# Patient Record
Sex: FEMALE | Race: OTHER | HISPANIC OR LATINO | URBAN - METROPOLITAN AREA
[De-identification: names, ages, dates, MRNs, and addresses within clinical notes are randomized per-mention and may not be internally consistent; named-entity substitution may affect disease eponyms.]

---

## 2018-07-09 ENCOUNTER — EMERGENCY (EMERGENCY)
Facility: HOSPITAL | Age: 73
LOS: 1 days | Discharge: HOME | End: 2018-07-09
Attending: EMERGENCY MEDICINE
Payer: SELF-PAY

## 2018-07-09 VITALS
TEMPERATURE: 101 F | RESPIRATION RATE: 20 BRPM | SYSTOLIC BLOOD PRESSURE: 125 MMHG | OXYGEN SATURATION: 97 % | DIASTOLIC BLOOD PRESSURE: 85 MMHG | HEART RATE: 84 BPM

## 2018-07-09 LAB
ALBUMIN SERPL ELPH-MCNC: 3.6 G/DL — SIGNIFICANT CHANGE UP (ref 3.3–5)
ALP SERPL-CCNC: 50 U/L — SIGNIFICANT CHANGE UP (ref 40–120)
ALT FLD-CCNC: 48 U/L — SIGNIFICANT CHANGE UP (ref 12–78)
ANION GAP SERPL CALC-SCNC: 7 MMOL/L — SIGNIFICANT CHANGE UP (ref 5–17)
APPEARANCE UR: CLEAR — SIGNIFICANT CHANGE UP
AST SERPL-CCNC: 64 U/L — HIGH (ref 15–37)
BASOPHILS # BLD AUTO: 0.04 K/UL — SIGNIFICANT CHANGE UP (ref 0–0.2)
BASOPHILS NFR BLD AUTO: 0.5 % — SIGNIFICANT CHANGE UP (ref 0–2)
BILIRUB SERPL-MCNC: 0.3 MG/DL — SIGNIFICANT CHANGE UP (ref 0.2–1.2)
BILIRUB UR-MCNC: NEGATIVE — SIGNIFICANT CHANGE UP
BUN SERPL-MCNC: 10 MG/DL — SIGNIFICANT CHANGE UP (ref 7–23)
CALCIUM SERPL-MCNC: 8.5 MG/DL — SIGNIFICANT CHANGE UP (ref 8.5–10.1)
CHLORIDE SERPL-SCNC: 106 MMOL/L — SIGNIFICANT CHANGE UP (ref 96–108)
CO2 SERPL-SCNC: 28 MMOL/L — SIGNIFICANT CHANGE UP (ref 22–31)
COLOR SPEC: YELLOW — SIGNIFICANT CHANGE UP
CREAT SERPL-MCNC: 0.88 MG/DL — SIGNIFICANT CHANGE UP (ref 0.5–1.3)
DIFF PNL FLD: ABNORMAL
EOSINOPHIL # BLD AUTO: 0.06 K/UL — SIGNIFICANT CHANGE UP (ref 0–0.5)
EOSINOPHIL NFR BLD AUTO: 0.8 % — SIGNIFICANT CHANGE UP (ref 0–6)
FLUAV H3 RNA SPEC QL NAA+PROBE: DETECTED
GLUCOSE SERPL-MCNC: 148 MG/DL — HIGH (ref 70–99)
GLUCOSE UR QL: NEGATIVE — SIGNIFICANT CHANGE UP
HCT VFR BLD CALC: 35.1 % — SIGNIFICANT CHANGE UP (ref 34.5–45)
HGB BLD-MCNC: 12.1 G/DL — SIGNIFICANT CHANGE UP (ref 11.5–15.5)
IMM GRANULOCYTES NFR BLD AUTO: 0.5 % — SIGNIFICANT CHANGE UP (ref 0–1.5)
KETONES UR-MCNC: NEGATIVE — SIGNIFICANT CHANGE UP
LACTATE SERPL-SCNC: 2.5 MMOL/L — HIGH (ref 0.7–2)
LEUKOCYTE ESTERASE UR-ACNC: NEGATIVE — SIGNIFICANT CHANGE UP
LIDOCAIN IGE QN: 149 U/L — SIGNIFICANT CHANGE UP (ref 73–393)
LYMPHOCYTES # BLD AUTO: 1.18 K/UL — SIGNIFICANT CHANGE UP (ref 1–3.3)
LYMPHOCYTES # BLD AUTO: 15.5 % — SIGNIFICANT CHANGE UP (ref 13–44)
MCHC RBC-ENTMCNC: 31.8 PG — SIGNIFICANT CHANGE UP (ref 27–34)
MCHC RBC-ENTMCNC: 34.5 GM/DL — SIGNIFICANT CHANGE UP (ref 32–36)
MCV RBC AUTO: 92.1 FL — SIGNIFICANT CHANGE UP (ref 80–100)
MONOCYTES # BLD AUTO: 0.7 K/UL — SIGNIFICANT CHANGE UP (ref 0–0.9)
MONOCYTES NFR BLD AUTO: 9.2 % — SIGNIFICANT CHANGE UP (ref 2–14)
NEUTROPHILS # BLD AUTO: 5.59 K/UL — SIGNIFICANT CHANGE UP (ref 1.8–7.4)
NEUTROPHILS NFR BLD AUTO: 73.5 % — SIGNIFICANT CHANGE UP (ref 43–77)
NITRITE UR-MCNC: NEGATIVE — SIGNIFICANT CHANGE UP
PH UR: 7 — SIGNIFICANT CHANGE UP (ref 5–8)
PLATELET # BLD AUTO: 196 K/UL — SIGNIFICANT CHANGE UP (ref 150–400)
POTASSIUM SERPL-MCNC: 3.9 MMOL/L — SIGNIFICANT CHANGE UP (ref 3.5–5.3)
POTASSIUM SERPL-SCNC: 3.9 MMOL/L — SIGNIFICANT CHANGE UP (ref 3.5–5.3)
PROCALCITONIN SERPL-MCNC: <0.05 — SIGNIFICANT CHANGE UP (ref 0–0.04)
PROT SERPL-MCNC: 7.8 G/DL — SIGNIFICANT CHANGE UP (ref 6–8.3)
PROT UR-MCNC: NEGATIVE — SIGNIFICANT CHANGE UP
RAPID RVP RESULT: DETECTED
RBC # BLD: 3.81 M/UL — SIGNIFICANT CHANGE UP (ref 3.8–5.2)
RBC # FLD: 12.5 % — SIGNIFICANT CHANGE UP (ref 10.3–14.5)
SODIUM SERPL-SCNC: 141 MMOL/L — SIGNIFICANT CHANGE UP (ref 135–145)
SP GR SPEC: 1.01 — SIGNIFICANT CHANGE UP (ref 1.01–1.02)
UROBILINOGEN FLD QL: NEGATIVE — SIGNIFICANT CHANGE UP
WBC # BLD: 7.61 K/UL — SIGNIFICANT CHANGE UP (ref 3.8–10.5)
WBC # FLD AUTO: 7.61 K/UL — SIGNIFICANT CHANGE UP (ref 3.8–10.5)

## 2018-07-09 PROCEDURE — 76705 ECHO EXAM OF ABDOMEN: CPT | Mod: 26

## 2018-07-09 PROCEDURE — 74176 CT ABD & PELVIS W/O CONTRAST: CPT

## 2018-07-09 PROCEDURE — 76705 ECHO EXAM OF ABDOMEN: CPT

## 2018-07-09 PROCEDURE — 71045 X-RAY EXAM CHEST 1 VIEW: CPT

## 2018-07-09 PROCEDURE — 81001 URINALYSIS AUTO W/SCOPE: CPT

## 2018-07-09 PROCEDURE — 80053 COMPREHEN METABOLIC PANEL: CPT

## 2018-07-09 PROCEDURE — 93005 ELECTROCARDIOGRAM TRACING: CPT

## 2018-07-09 PROCEDURE — 87633 RESP VIRUS 12-25 TARGETS: CPT

## 2018-07-09 PROCEDURE — 87798 DETECT AGENT NOS DNA AMP: CPT

## 2018-07-09 PROCEDURE — 83605 ASSAY OF LACTIC ACID: CPT

## 2018-07-09 PROCEDURE — 87581 M.PNEUMON DNA AMP PROBE: CPT

## 2018-07-09 PROCEDURE — 83690 ASSAY OF LIPASE: CPT

## 2018-07-09 PROCEDURE — 96361 HYDRATE IV INFUSION ADD-ON: CPT

## 2018-07-09 PROCEDURE — 96360 HYDRATION IV INFUSION INIT: CPT

## 2018-07-09 PROCEDURE — 84484 ASSAY OF TROPONIN QUANT: CPT

## 2018-07-09 PROCEDURE — 71045 X-RAY EXAM CHEST 1 VIEW: CPT | Mod: 26

## 2018-07-09 PROCEDURE — 87086 URINE CULTURE/COLONY COUNT: CPT

## 2018-07-09 PROCEDURE — 99284 EMERGENCY DEPT VISIT MOD MDM: CPT

## 2018-07-09 PROCEDURE — 87040 BLOOD CULTURE FOR BACTERIA: CPT

## 2018-07-09 PROCEDURE — 74176 CT ABD & PELVIS W/O CONTRAST: CPT | Mod: 26

## 2018-07-09 PROCEDURE — 93010 ELECTROCARDIOGRAM REPORT: CPT

## 2018-07-09 PROCEDURE — 82550 ASSAY OF CK (CPK): CPT

## 2018-07-09 PROCEDURE — 87486 CHLMYD PNEUM DNA AMP PROBE: CPT

## 2018-07-09 PROCEDURE — 85027 COMPLETE CBC AUTOMATED: CPT

## 2018-07-09 PROCEDURE — 82553 CREATINE MB FRACTION: CPT

## 2018-07-09 PROCEDURE — 99284 EMERGENCY DEPT VISIT MOD MDM: CPT | Mod: 25

## 2018-07-09 PROCEDURE — 84145 PROCALCITONIN (PCT): CPT

## 2018-07-09 PROCEDURE — 36415 COLL VENOUS BLD VENIPUNCTURE: CPT

## 2018-07-09 RX ORDER — SODIUM CHLORIDE 9 MG/ML
1000 INJECTION INTRAMUSCULAR; INTRAVENOUS; SUBCUTANEOUS ONCE
Qty: 0 | Refills: 0 | Status: COMPLETED | OUTPATIENT
Start: 2018-07-09 | End: 2018-07-09

## 2018-07-09 RX ORDER — ACETAMINOPHEN 500 MG
650 TABLET ORAL ONCE
Qty: 0 | Refills: 0 | Status: COMPLETED | OUTPATIENT
Start: 2018-07-09 | End: 2018-07-09

## 2018-07-09 RX ORDER — SODIUM CHLORIDE 9 MG/ML
3 INJECTION INTRAMUSCULAR; INTRAVENOUS; SUBCUTANEOUS ONCE
Qty: 0 | Refills: 0 | Status: COMPLETED | OUTPATIENT
Start: 2018-07-09 | End: 2018-07-09

## 2018-07-09 RX ADMIN — SODIUM CHLORIDE 1000 MILLILITER(S): 9 INJECTION INTRAMUSCULAR; INTRAVENOUS; SUBCUTANEOUS at 23:45

## 2018-07-09 RX ADMIN — SODIUM CHLORIDE 3 MILLILITER(S): 9 INJECTION INTRAMUSCULAR; INTRAVENOUS; SUBCUTANEOUS at 21:32

## 2018-07-09 RX ADMIN — SODIUM CHLORIDE 1000 MILLILITER(S): 9 INJECTION INTRAMUSCULAR; INTRAVENOUS; SUBCUTANEOUS at 22:45

## 2018-07-09 RX ADMIN — SODIUM CHLORIDE 1000 MILLILITER(S): 9 INJECTION INTRAMUSCULAR; INTRAVENOUS; SUBCUTANEOUS at 21:45

## 2018-07-09 RX ADMIN — Medication 650 MILLIGRAM(S): at 21:46

## 2018-07-09 NOTE — ED PROVIDER NOTE - OBJECTIVE STATEMENT
Pt is a 72 yo female who presents to the ED with a cc of fever, cough.  Pt reports that symptoms began last night.  She reports frontal HA, subjective fevers, chills, sore throat, non-productive cough, diffuse chest pain worse when coughing, and SOB.  She further reports epigastric discomfort.  Denies neck pain. N/V/D/C, ext numbness or weakness.  Pt reports that she is visiting from [facility] and has been in the [place] for approx 1 [place].  She currently takes a PPI and a BP medication that is prescribed by her doctor at home.  She has not taken anything for the symptoms

## 2018-07-09 NOTE — ED PROVIDER NOTE - PLAN OF CARE
Return to the ED for any new or worsening symptoms  Take your medication as prescribed  Tamiflu per label instructions   Augmentin 1 tab 2 times a day finish the prescription  Flonase 1 spray to each nostril   Zofran per label instructions as needed for nausea   Tylenol or Motrin per label instructions as needed for fever  Increase your fluid intake   Advance activity as tolerated   Follow up with your PMD in 2-3 days for a recheck

## 2018-07-09 NOTE — ED PROVIDER NOTE - MEDICAL DECISION MAKING DETAILS
screening septic work up, EKG, cardiac enzymes, RVP, chest x-ray, gallbladder sonogram, CT renal stone hunt, IVF, symptom control, observation

## 2018-07-09 NOTE — ED PROVIDER NOTE - PROGRESS NOTE DETAILS
Pt with improvement in symptoms, tolerating po. Results of labs and images reviewed, all questions answered, copy provided.  Will discharge home with outpatient management.

## 2018-07-09 NOTE — ED PROVIDER NOTE - CARE PLAN
Principal Discharge DX:	Influenza A  Assessment and plan of treatment:	Return to the ED for any new or worsening symptoms  Take your medication as prescribed  Tamiflu per label instructions   Augmentin 1 tab 2 times a day finish the prescription  Flonase 1 spray to each nostril   Zofran per label instructions as needed for nausea   Tylenol or Motrin per label instructions as needed for fever  Increase your fluid intake   Advance activity as tolerated   Follow up with your PMD in 2-3 days for a recheck  Secondary Diagnosis:	Otitis media  Secondary Diagnosis:	Cholelithiasis

## 2018-07-10 VITALS
OXYGEN SATURATION: 97 % | HEART RATE: 67 BPM | DIASTOLIC BLOOD PRESSURE: 79 MMHG | SYSTOLIC BLOOD PRESSURE: 125 MMHG | TEMPERATURE: 99 F | RESPIRATION RATE: 16 BRPM

## 2018-07-10 LAB
CULTURE RESULTS: SIGNIFICANT CHANGE UP
LACTATE SERPL-SCNC: 1.8 MMOL/L — SIGNIFICANT CHANGE UP (ref 0.7–2)
SPECIMEN SOURCE: SIGNIFICANT CHANGE UP

## 2018-07-10 RX ORDER — FLUTICASONE PROPIONATE 50 MCG
1 SPRAY, SUSPENSION NASAL
Qty: 1 | Refills: 0
Start: 2018-07-10 | End: 2018-08-08

## 2018-07-10 RX ORDER — ONDANSETRON 8 MG/1
1 TABLET, FILM COATED ORAL
Qty: 15 | Refills: 0
Start: 2018-07-10 | End: 2018-07-14

## 2018-07-14 LAB
CULTURE RESULTS: SIGNIFICANT CHANGE UP
CULTURE RESULTS: SIGNIFICANT CHANGE UP
SPECIMEN SOURCE: SIGNIFICANT CHANGE UP
SPECIMEN SOURCE: SIGNIFICANT CHANGE UP

## 2019-10-01 ENCOUNTER — TRANSCRIPTION ENCOUNTER (OUTPATIENT)
Age: 74
End: 2019-10-01

## 2019-10-01 ENCOUNTER — INPATIENT (INPATIENT)
Facility: HOSPITAL | Age: 74
LOS: 4 days | Discharge: HOME | DRG: 854 | End: 2019-10-06
Attending: STUDENT IN AN ORGANIZED HEALTH CARE EDUCATION/TRAINING PROGRAM | Admitting: STUDENT IN AN ORGANIZED HEALTH CARE EDUCATION/TRAINING PROGRAM
Payer: MEDICAID

## 2019-10-01 VITALS
SYSTOLIC BLOOD PRESSURE: 136 MMHG | HEART RATE: 86 BPM | RESPIRATION RATE: 18 BRPM | OXYGEN SATURATION: 94 % | DIASTOLIC BLOOD PRESSURE: 86 MMHG | TEMPERATURE: 99 F | WEIGHT: 164.91 LBS

## 2019-10-01 LAB
ALBUMIN SERPL ELPH-MCNC: 3.7 G/DL — SIGNIFICANT CHANGE UP (ref 3.3–5)
ALP SERPL-CCNC: 63 U/L — SIGNIFICANT CHANGE UP (ref 40–120)
ALT FLD-CCNC: 56 U/L — SIGNIFICANT CHANGE UP (ref 12–78)
ANION GAP SERPL CALC-SCNC: 7 MMOL/L — SIGNIFICANT CHANGE UP (ref 5–17)
APTT BLD: 26.7 SEC — LOW (ref 28.5–37)
AST SERPL-CCNC: 64 U/L — HIGH (ref 15–37)
BASOPHILS # BLD AUTO: 0.05 K/UL — SIGNIFICANT CHANGE UP (ref 0–0.2)
BASOPHILS NFR BLD AUTO: 0.3 % — SIGNIFICANT CHANGE UP (ref 0–2)
BILIRUB SERPL-MCNC: 0.6 MG/DL — SIGNIFICANT CHANGE UP (ref 0.2–1.2)
BUN SERPL-MCNC: 13 MG/DL — SIGNIFICANT CHANGE UP (ref 7–23)
CALCIUM SERPL-MCNC: 8.8 MG/DL — SIGNIFICANT CHANGE UP (ref 8.5–10.1)
CHLORIDE SERPL-SCNC: 107 MMOL/L — SIGNIFICANT CHANGE UP (ref 96–108)
CO2 SERPL-SCNC: 26 MMOL/L — SIGNIFICANT CHANGE UP (ref 22–31)
CREAT SERPL-MCNC: 1 MG/DL — SIGNIFICANT CHANGE UP (ref 0.5–1.3)
EOSINOPHIL # BLD AUTO: 0.03 K/UL — SIGNIFICANT CHANGE UP (ref 0–0.5)
EOSINOPHIL NFR BLD AUTO: 0.2 % — SIGNIFICANT CHANGE UP (ref 0–6)
GLUCOSE SERPL-MCNC: 170 MG/DL — HIGH (ref 70–99)
HCT VFR BLD CALC: 38 % — SIGNIFICANT CHANGE UP (ref 34.5–45)
HGB BLD-MCNC: 12.6 G/DL — SIGNIFICANT CHANGE UP (ref 11.5–15.5)
IMM GRANULOCYTES NFR BLD AUTO: 0.3 % — SIGNIFICANT CHANGE UP (ref 0–1.5)
INR BLD: 1.09 RATIO — SIGNIFICANT CHANGE UP (ref 0.88–1.16)
LYMPHOCYTES # BLD AUTO: 1.1 K/UL — SIGNIFICANT CHANGE UP (ref 1–3.3)
LYMPHOCYTES # BLD AUTO: 7.1 % — LOW (ref 13–44)
MCHC RBC-ENTMCNC: 31.2 PG — SIGNIFICANT CHANGE UP (ref 27–34)
MCHC RBC-ENTMCNC: 33.2 GM/DL — SIGNIFICANT CHANGE UP (ref 32–36)
MCV RBC AUTO: 94.1 FL — SIGNIFICANT CHANGE UP (ref 80–100)
MONOCYTES # BLD AUTO: 0.88 K/UL — SIGNIFICANT CHANGE UP (ref 0–0.9)
MONOCYTES NFR BLD AUTO: 5.7 % — SIGNIFICANT CHANGE UP (ref 2–14)
NEUTROPHILS # BLD AUTO: 13.35 K/UL — HIGH (ref 1.8–7.4)
NEUTROPHILS NFR BLD AUTO: 86.4 % — HIGH (ref 43–77)
NRBC # BLD: 0 /100 WBCS — SIGNIFICANT CHANGE UP (ref 0–0)
PLATELET # BLD AUTO: 144 K/UL — LOW (ref 150–400)
POTASSIUM SERPL-MCNC: 3.8 MMOL/L — SIGNIFICANT CHANGE UP (ref 3.5–5.3)
POTASSIUM SERPL-SCNC: 3.8 MMOL/L — SIGNIFICANT CHANGE UP (ref 3.5–5.3)
PROT SERPL-MCNC: 8.1 G/DL — SIGNIFICANT CHANGE UP (ref 6–8.3)
PROTHROM AB SERPL-ACNC: 12.3 SEC — SIGNIFICANT CHANGE UP (ref 10–12.9)
RBC # BLD: 4.04 M/UL — SIGNIFICANT CHANGE UP (ref 3.8–5.2)
RBC # FLD: 13 % — SIGNIFICANT CHANGE UP (ref 10.3–14.5)
SODIUM SERPL-SCNC: 140 MMOL/L — SIGNIFICANT CHANGE UP (ref 135–145)
WBC # BLD: 15.46 K/UL — HIGH (ref 3.8–10.5)
WBC # FLD AUTO: 15.46 K/UL — HIGH (ref 3.8–10.5)

## 2019-10-01 RX ORDER — PIPERACILLIN AND TAZOBACTAM 4; .5 G/20ML; G/20ML
3.38 INJECTION, POWDER, LYOPHILIZED, FOR SOLUTION INTRAVENOUS ONCE
Refills: 0 | Status: COMPLETED | OUTPATIENT
Start: 2019-10-01 | End: 2019-10-01

## 2019-10-01 RX ORDER — ACETAMINOPHEN 500 MG
650 TABLET ORAL ONCE
Refills: 0 | Status: COMPLETED | OUTPATIENT
Start: 2019-10-01 | End: 2019-10-01

## 2019-10-01 RX ORDER — SODIUM CHLORIDE 9 MG/ML
2000 INJECTION INTRAMUSCULAR; INTRAVENOUS; SUBCUTANEOUS ONCE
Refills: 0 | Status: COMPLETED | OUTPATIENT
Start: 2019-10-01 | End: 2019-10-01

## 2019-10-01 RX ADMIN — Medication 650 MILLIGRAM(S): at 23:40

## 2019-10-01 RX ADMIN — PIPERACILLIN AND TAZOBACTAM 200 GRAM(S): 4; .5 INJECTION, POWDER, LYOPHILIZED, FOR SOLUTION INTRAVENOUS at 23:41

## 2019-10-01 RX ADMIN — SODIUM CHLORIDE 2000 MILLILITER(S): 9 INJECTION INTRAMUSCULAR; INTRAVENOUS; SUBCUTANEOUS at 23:41

## 2019-10-01 NOTE — ED PROVIDER NOTE - CARE PLAN
Principal Discharge DX:	Obstructive uropathy Principal Discharge DX:	Obstructive uropathy  Secondary Diagnosis:	Sepsis

## 2019-10-01 NOTE — ED PROVIDER NOTE - OBJECTIVE STATEMENT
HO from pt's friend Corinne.  pt llq abd pain today c fever 100.  pt is here on vac from [facility]. pmd none.  prior ho due "colon problem".

## 2019-10-01 NOTE — ED ADULT NURSE NOTE - OBJECTIVE STATEMENT
Pt received alert and oriented x 4 c/o fever, lower back pain and lower abd pain. Pt reports 8/10 pain to lower back and abdomen with chills and body aches. Pt reports nausea, but denies fever, chills, vomiting, diarrhea, sob, chest pain. Pt is from [facility] visiting family. Pt has been here since 8/13/19,

## 2019-10-01 NOTE — ED ADULT NURSE NOTE - NSIMPLEMENTINTERV_GEN_ALL_ED
Implemented All Fall Risk Interventions:  [place] to call system. Call bell, personal items and telephone within reach. Instruct patient to call for assistance. Room bathroom lighting operational. Non-slip footwear when patient is off stretcher. Physically safe environment: no spills, clutter or unnecessary equipment. Stretcher in lowest position, wheels locked, appropriate side rails in place. Provide visual cue, wrist band, yellow gown, etc. Monitor gait and stability. Monitor for mental status changes and reorient to person, place, and time. Review medications for side effects contributing to fall risk. Reinforce activity limits and safety measures with patient and family.

## 2019-10-02 DIAGNOSIS — I10 ESSENTIAL (PRIMARY) HYPERTENSION: ICD-10-CM

## 2019-10-02 DIAGNOSIS — Z29.9 ENCOUNTER FOR PROPHYLACTIC MEASURES, UNSPECIFIED: ICD-10-CM

## 2019-10-02 DIAGNOSIS — N13.9 OBSTRUCTIVE AND REFLUX UROPATHY, UNSPECIFIED: ICD-10-CM

## 2019-10-02 DIAGNOSIS — A41.9 SEPSIS, UNSPECIFIED ORGANISM: ICD-10-CM

## 2019-10-02 DIAGNOSIS — K21.9 GASTRO-ESOPHAGEAL REFLUX DISEASE WITHOUT ESOPHAGITIS: ICD-10-CM

## 2019-10-02 DIAGNOSIS — N20.1 CALCULUS OF URETER: ICD-10-CM

## 2019-10-02 LAB
ANION GAP SERPL CALC-SCNC: 9 MMOL/L — SIGNIFICANT CHANGE UP (ref 5–17)
APPEARANCE UR: CLEAR — SIGNIFICANT CHANGE UP
BILIRUB UR-MCNC: NEGATIVE — SIGNIFICANT CHANGE UP
BUN SERPL-MCNC: 11 MG/DL — SIGNIFICANT CHANGE UP (ref 7–23)
CALCIUM SERPL-MCNC: 7.6 MG/DL — LOW (ref 8.5–10.1)
CHLORIDE SERPL-SCNC: 114 MMOL/L — HIGH (ref 96–108)
CO2 SERPL-SCNC: 22 MMOL/L — SIGNIFICANT CHANGE UP (ref 22–31)
COLOR SPEC: SIGNIFICANT CHANGE UP
CREAT SERPL-MCNC: 0.95 MG/DL — SIGNIFICANT CHANGE UP (ref 0.5–1.3)
DIFF PNL FLD: ABNORMAL
E CLOAC COMP DNA BLD POS QL NAA+PROBE: SIGNIFICANT CHANGE UP
EPI CELLS # UR: SIGNIFICANT CHANGE UP
GLUCOSE SERPL-MCNC: 149 MG/DL — HIGH (ref 70–99)
GLUCOSE UR QL: NEGATIVE — SIGNIFICANT CHANGE UP
GRAM STN FLD: SIGNIFICANT CHANGE UP
GRAM STN FLD: SIGNIFICANT CHANGE UP
HCT VFR BLD CALC: 34.3 % — LOW (ref 34.5–45)
HGB BLD-MCNC: 11.3 G/DL — LOW (ref 11.5–15.5)
KETONES UR-MCNC: NEGATIVE — SIGNIFICANT CHANGE UP
LACTATE SERPL-SCNC: 3.2 MMOL/L — HIGH (ref 0.7–2)
LACTATE SERPL-SCNC: 4.4 MMOL/L — CRITICAL HIGH (ref 0.7–2)
LACTATE SERPL-SCNC: 4.4 MMOL/L — CRITICAL HIGH (ref 0.7–2)
LACTATE SERPL-SCNC: 5.9 MMOL/L — CRITICAL HIGH (ref 0.7–2)
LEUKOCYTE ESTERASE UR-ACNC: ABNORMAL
MCHC RBC-ENTMCNC: 31.4 PG — SIGNIFICANT CHANGE UP (ref 27–34)
MCHC RBC-ENTMCNC: 32.9 GM/DL — SIGNIFICANT CHANGE UP (ref 32–36)
MCV RBC AUTO: 95.3 FL — SIGNIFICANT CHANGE UP (ref 80–100)
METHOD TYPE: SIGNIFICANT CHANGE UP
NITRITE UR-MCNC: NEGATIVE — SIGNIFICANT CHANGE UP
NRBC # BLD: 0 /100 WBCS — SIGNIFICANT CHANGE UP (ref 0–0)
PH UR: 6.5 — SIGNIFICANT CHANGE UP (ref 5–8)
PLATELET # BLD AUTO: 121 K/UL — LOW (ref 150–400)
POTASSIUM SERPL-MCNC: 3.4 MMOL/L — LOW (ref 3.5–5.3)
POTASSIUM SERPL-SCNC: 3.4 MMOL/L — LOW (ref 3.5–5.3)
PROT UR-MCNC: NEGATIVE — SIGNIFICANT CHANGE UP
RBC # BLD: 3.6 M/UL — LOW (ref 3.8–5.2)
RBC # FLD: 13.2 % — SIGNIFICANT CHANGE UP (ref 10.3–14.5)
RBC CASTS # UR COMP ASSIST: SIGNIFICANT CHANGE UP /HPF (ref 0–4)
SODIUM SERPL-SCNC: 145 MMOL/L — SIGNIFICANT CHANGE UP (ref 135–145)
SP GR SPEC: 1 — LOW (ref 1.01–1.02)
SPECIMEN SOURCE: SIGNIFICANT CHANGE UP
SPECIMEN SOURCE: SIGNIFICANT CHANGE UP
UROBILINOGEN FLD QL: NEGATIVE — SIGNIFICANT CHANGE UP
WBC # BLD: 9.69 K/UL — SIGNIFICANT CHANGE UP (ref 3.8–10.5)
WBC # FLD AUTO: 9.69 K/UL — SIGNIFICANT CHANGE UP (ref 3.8–10.5)
WBC UR QL: SIGNIFICANT CHANGE UP

## 2019-10-02 PROCEDURE — 71045 X-RAY EXAM CHEST 1 VIEW: CPT | Mod: 26

## 2019-10-02 PROCEDURE — 12345: CPT | Mod: NC

## 2019-10-02 PROCEDURE — 74177 CT ABD & PELVIS W/CONTRAST: CPT | Mod: 26

## 2019-10-02 PROCEDURE — 99285 EMERGENCY DEPT VISIT HI MDM: CPT

## 2019-10-02 PROCEDURE — 93010 ELECTROCARDIOGRAM REPORT: CPT

## 2019-10-02 RX ORDER — TRAMADOL HYDROCHLORIDE 50 MG/1
25 TABLET ORAL THREE TIMES A DAY
Refills: 0 | Status: DISCONTINUED | OUTPATIENT
Start: 2019-10-02 | End: 2019-10-06

## 2019-10-02 RX ORDER — IBUPROFEN 200 MG
600 TABLET ORAL ONCE
Refills: 0 | Status: COMPLETED | OUTPATIENT
Start: 2019-10-02 | End: 2019-10-02

## 2019-10-02 RX ORDER — ACETAMINOPHEN 500 MG
1000 TABLET ORAL ONCE
Refills: 0 | Status: DISCONTINUED | OUTPATIENT
Start: 2019-10-02 | End: 2019-10-02

## 2019-10-02 RX ORDER — ACETAMINOPHEN 500 MG
1000 TABLET ORAL EVERY 6 HOURS
Refills: 0 | Status: DISCONTINUED | OUTPATIENT
Start: 2019-10-02 | End: 2019-10-06

## 2019-10-02 RX ORDER — CEFTRIAXONE 500 MG/1
1000 INJECTION, POWDER, FOR SOLUTION INTRAMUSCULAR; INTRAVENOUS EVERY 24 HOURS
Refills: 0 | Status: DISCONTINUED | OUTPATIENT
Start: 2019-10-02 | End: 2019-10-02

## 2019-10-02 RX ORDER — ONDANSETRON 8 MG/1
4 TABLET, FILM COATED ORAL EVERY 6 HOURS
Refills: 0 | Status: DISCONTINUED | OUTPATIENT
Start: 2019-10-02 | End: 2019-10-02

## 2019-10-02 RX ORDER — ESOMEPRAZOLE MAGNESIUM 40 MG/1
1 CAPSULE, DELAYED RELEASE ORAL
Qty: 0 | Refills: 0 | DISCHARGE

## 2019-10-02 RX ORDER — OLMESARTAN MEDOXOMIL 5 MG/1
1 TABLET, FILM COATED ORAL
Qty: 0 | Refills: 0 | DISCHARGE

## 2019-10-02 RX ORDER — SODIUM CHLORIDE 9 MG/ML
500 INJECTION INTRAMUSCULAR; INTRAVENOUS; SUBCUTANEOUS ONCE
Refills: 0 | Status: COMPLETED | OUTPATIENT
Start: 2019-10-02 | End: 2019-10-02

## 2019-10-02 RX ORDER — PHENAZOPYRIDINE HCL 100 MG
200 TABLET ORAL THREE TIMES A DAY
Refills: 0 | Status: DISCONTINUED | OUTPATIENT
Start: 2019-10-02 | End: 2019-10-06

## 2019-10-02 RX ORDER — PANTOPRAZOLE SODIUM 20 MG/1
40 TABLET, DELAYED RELEASE ORAL
Refills: 0 | Status: DISCONTINUED | OUTPATIENT
Start: 2019-10-02 | End: 2019-10-06

## 2019-10-02 RX ORDER — POTASSIUM CHLORIDE 20 MEQ
40 PACKET (EA) ORAL ONCE
Refills: 0 | Status: COMPLETED | OUTPATIENT
Start: 2019-10-02 | End: 2019-10-02

## 2019-10-02 RX ORDER — CEFTRIAXONE 500 MG/1
1000 INJECTION, POWDER, FOR SOLUTION INTRAMUSCULAR; INTRAVENOUS EVERY 24 HOURS
Refills: 0 | Status: DISCONTINUED | OUTPATIENT
Start: 2019-10-02 | End: 2019-10-03

## 2019-10-02 RX ORDER — ACETAMINOPHEN 500 MG
650 TABLET ORAL EVERY 6 HOURS
Refills: 0 | Status: DISCONTINUED | OUTPATIENT
Start: 2019-10-02 | End: 2019-10-06

## 2019-10-02 RX ORDER — HYDROMORPHONE HYDROCHLORIDE 2 MG/ML
0.5 INJECTION INTRAMUSCULAR; INTRAVENOUS; SUBCUTANEOUS
Refills: 0 | Status: DISCONTINUED | OUTPATIENT
Start: 2019-10-02 | End: 2019-10-02

## 2019-10-02 RX ORDER — SODIUM CHLORIDE 9 MG/ML
1000 INJECTION INTRAMUSCULAR; INTRAVENOUS; SUBCUTANEOUS ONCE
Refills: 0 | Status: COMPLETED | OUTPATIENT
Start: 2019-10-02 | End: 2019-10-02

## 2019-10-02 RX ORDER — SODIUM CHLORIDE 9 MG/ML
1000 INJECTION INTRAMUSCULAR; INTRAVENOUS; SUBCUTANEOUS
Refills: 0 | Status: DISCONTINUED | OUTPATIENT
Start: 2019-10-02 | End: 2019-10-02

## 2019-10-02 RX ORDER — SODIUM CHLORIDE 9 MG/ML
1000 INJECTION, SOLUTION INTRAVENOUS
Refills: 0 | Status: DISCONTINUED | OUTPATIENT
Start: 2019-10-02 | End: 2019-10-03

## 2019-10-02 RX ADMIN — Medication 600 MILLIGRAM(S): at 01:59

## 2019-10-02 RX ADMIN — SODIUM CHLORIDE 500 MILLILITER(S): 9 INJECTION INTRAMUSCULAR; INTRAVENOUS; SUBCUTANEOUS at 01:20

## 2019-10-02 RX ADMIN — SODIUM CHLORIDE 1000 MILLILITER(S): 9 INJECTION INTRAMUSCULAR; INTRAVENOUS; SUBCUTANEOUS at 03:35

## 2019-10-02 RX ADMIN — SODIUM CHLORIDE 500 MILLILITER(S): 9 INJECTION INTRAMUSCULAR; INTRAVENOUS; SUBCUTANEOUS at 00:20

## 2019-10-02 RX ADMIN — SODIUM CHLORIDE 2000 MILLILITER(S): 9 INJECTION INTRAMUSCULAR; INTRAVENOUS; SUBCUTANEOUS at 00:41

## 2019-10-02 RX ADMIN — CEFTRIAXONE 100 MILLIGRAM(S): 500 INJECTION, POWDER, FOR SOLUTION INTRAMUSCULAR; INTRAVENOUS at 06:33

## 2019-10-02 RX ADMIN — SODIUM CHLORIDE 125 MILLILITER(S): 9 INJECTION, SOLUTION INTRAVENOUS at 22:22

## 2019-10-02 RX ADMIN — Medication 40 MILLIEQUIVALENT(S): at 22:20

## 2019-10-02 RX ADMIN — Medication 650 MILLIGRAM(S): at 00:40

## 2019-10-02 RX ADMIN — Medication 650 MILLIGRAM(S): at 14:07

## 2019-10-02 RX ADMIN — Medication 200 MILLIGRAM(S): at 22:18

## 2019-10-02 RX ADMIN — CEFTRIAXONE 100 MILLIGRAM(S): 500 INJECTION, POWDER, FOR SOLUTION INTRAMUSCULAR; INTRAVENOUS at 06:30

## 2019-10-02 RX ADMIN — Medication 200 MILLIGRAM(S): at 14:08

## 2019-10-02 RX ADMIN — SODIUM CHLORIDE 100 MILLILITER(S): 9 INJECTION INTRAMUSCULAR; INTRAVENOUS; SUBCUTANEOUS at 07:09

## 2019-10-02 RX ADMIN — PIPERACILLIN AND TAZOBACTAM 3.38 GRAM(S): 4; .5 INJECTION, POWDER, LYOPHILIZED, FOR SOLUTION INTRAVENOUS at 00:41

## 2019-10-02 RX ADMIN — SODIUM CHLORIDE 100 MILLILITER(S): 9 INJECTION INTRAMUSCULAR; INTRAVENOUS; SUBCUTANEOUS at 07:16

## 2019-10-02 RX ADMIN — SODIUM CHLORIDE 125 MILLILITER(S): 9 INJECTION, SOLUTION INTRAVENOUS at 14:13

## 2019-10-02 RX ADMIN — Medication 650 MILLIGRAM(S): at 15:00

## 2019-10-02 NOTE — H&P ADULT - PROBLEM SELECTOR PLAN 4
DVT ppx: SCDs for now; heparin subQ post-operatively    IMPROVE VTE Individual Risk Assessment  RISK                                                                Points  [  ] Previous VTE                                                  3  [  ] Thrombophilia                                               2  [  ] Lower limb paralysis                                      2        (unable to hold up >15 seconds)    [  ] Current Cancer                                              2         (within 6 months)  [  ] Immobilization > 24 hrs                                1  [  ] ICU/CCU stay > 24 hours                              1  [ x ] Age > 60                                                      1    IMPROVE VTE Score: 1    IMPROVE Score 0-1: Low Risk, No VTE prophylaxis required for most patients, encourage ambulation. states that she has a colon bacterial problem  on home esomeprazole  continue PPI post operatively states that she has a colon bacterial problem  on home esomeprazole  continue PPI post-op

## 2019-10-02 NOTE — CONSULT NOTE ADULT - SUBJECTIVE AND OBJECTIVE BOX
CHIEF COMPLAINT: Left abdominal pain    HISTORY OF PRESENT ILLNESS:    74 year old female with a several day h/o left abdominal pain and fever. Patient reports having a "colon problem" that her pain was initially attributed to. Pain intensified and she developed fever and chills. temp was 102.1 upon presentation to ED. It has since declined. Her lactate level has increased to 4.4. Wbc 15.46 with left shift. CT demonstrated a 1 cm left UPJ calculus with hydronephrosis.    PAST MEDICAL & SURGICAL HISTORY:  No pertinent past medical history: reported hx of &quot;colon problem&quot;  No significant past surgical history      REVIEW OF SYSTEMS:    CONSTITUTIONAL: fever and chills as above  EYES/ENT: No visual changes;  No vertigo or throat pain   NECK: No pain or stiffness  RESPIRATORY: No cough, wheezing, hemoptysis; No shortness of breath  CARDIOVASCULAR: No chest pain or palpitations  GASTROINTESTINAL: Left sided abdominal pain. No diarrhea or constipation. No melena or hematochezia.  GENITOURINARY: No dysuria, frequency or hematuria  NEUROLOGICAL: No numbness or weakness  SKIN: No itching, burning, rashes, or lesions   All other review of systems is negative unless indicated above.    MEDICATIONS  (STANDING):    MEDICATIONS  (PRN):      Allergies None    No Known Allergies    Intolerances        SOCIAL HISTORY:    FAMILY HISTORY:  No pertinent family history in first degree relatives      Vital Signs Last 24 Hrs  T(C): 37.7 (02 Oct 2019 05:24), Max: 39.2 (02 Oct 2019 01:50)  T(F): 99.9 (02 Oct 2019 05:24), Max: 102.5 (02 Oct 2019 01:50)  HR: 83 (02 Oct 2019 05:24) (83 - 98)  BP: 100/66 (02 Oct 2019 05:24) (100/66 - 145/79)  BP(mean): --  RR: 16 (02 Oct 2019 05:24) (16 - 18)  SpO2: 96% (02 Oct 2019 05:24) (94% - 96%)    PHYSICAL EXAM:    Constitutional: NAD, well-developed  Back: Moderate left CVA tenderness  Abd: BS+, Moderate left CVAT and LLQ tenderness  Extremities: No peripheral edema  Psychiatric: Normal mood, normal affect  Skin: No rashes    LABS:                        12.6   15.46 )-----------( 144      ( 01 Oct 2019 23:18 )             38.0     10    140  |  107  |  13  ----------------------------<  170<H>  3.8   |  26  |  1.00    Ca    8.8      01 Oct 2019 23:18    TPro  8.1  /  Alb  3.7  /  TBili  0.6  /  DBili  x   /  AST  64<H>  /  ALT  56  /  AlkPhos  63  10-01    PT/INR - ( 01 Oct 2019 23:18 )   PT: 12.3 sec;   INR: 1.09 ratio         PTT - ( 01 Oct 2019 23:18 )  PTT:26.7 sec  Urinalysis Basic - ( 02 Oct 2019 04:37 )    Color: Pale Yellow / Appearance: Clear / S.005 / pH: x  Gluc: x / Ketone: Negative  / Bili: Negative / Urobili: Negative   Blood: x / Protein: Negative / Nitrite: Negative   Leuk Esterase: Trace / RBC: 0-2 /HPF / WBC 0-2   Sq Epi: x / Non Sq Epi: Few / Bacteria: x      RADIOLOGY & ADDITIONAL STUDIES:    < from: CT Abdomen and Pelvis w/ IV Cont (10.02.19 @ 03:53) >    EXAM:  CT ABDOMEN AND PELVIS IC                            PROCEDURE DATE:  10/02/2019          INTERPRETATION:  CLINICAL INFORMATION: Left abdominal pain.    COMPARISON: None.    TECHNIQUE: Contrast enhanced CT of the abdomen and pelvis was performed   with coronal and sagittal reformats. 95 cc Omnipaque 350 were   administered intravenously and 5 cc were discarded.     FINDINGS:    LOWER CHEST: Within normal limits.    HEPATOBILIARY: Within normal limits.  PANCREAS: Within normal limits.  SPLEEN: Within normal limits.  ADRENALS: Within normal limits.    KIDNEYS/URETERS/BLADDER: 1 cm left ureteropelvic junction calculus   causing moderate hydronephrosis, delayed nephrogram and mild perinephric   fat stranding with trace pararenal and left paracolic gutter ascites.   Right kidney, ureter and bladder are within normal limits.  REPRODUCTIVE ORGANS: Within normal limits.    BOWEL/PERITONEUM: No bowel obstruction or pneumoperitoneum.  Appendix not   clearly visualized, however no pericecal inflammation. Portions of the   gastrointestinal tract are collapsed, limiting evaluation.     VESSELS:  Within normal limits.  LYMPHATICS/RETROPERITONEUM: No lymphadenopathy. No retroperitoneal   hematoma.      SOFT TISSUES: Within normal limits.  BONES: Multilevel spinal degenerative changes.    IMPRESSION: Left obstructive uropathy secondary to 1 cm left   ureteropelvic junction calculus. Urology follow-up recommended.                          JASSON CLOUD M.D., ATTENDING RADIOLOGIST  This document has been electronically signed. Oct  2 2019  3:54AM                < end of copied text >

## 2019-10-02 NOTE — H&P ADULT - PROBLEM SELECTOR PLAN 1
L obstructive uropathy 2/2 L ureteropelvic junction calculus on CT A/P  - Admit to [facility]  - s/p NS boluses (total of 3.5 L), IV Zosyn x1 in the ED L obstructive uropathy 2/2 L ureteropelvic junction calculus on CT A/P  - Admit to F  - s/p NS boluses (total of 3.5 L), IV Zosyn x1 in the ED  - for OR with Dr. Tay this AM  - EKG NSR at 87bpm LAD ?anterior infarct no acute ischemic changes  - no history of cardiac problems, denies having echocardiogram, no cardiac sx  - No absolute medical contraindication to proposed procedure  - would continue with rocephin post operatively  - pain control PRN secondary to obstructing stone  f/u blood/urine cultures  s/p IV zosyn in ED  would continue with IV rocephin post-op  for emergent OR with Dr. Tay secondary to obstructing stone  f/u blood/urine cultures  s/p IV zosyn in ED and >30cc/kg NS boluses  continue maintenance fluids post operative  lactate trended up, check repeat lactate post operatively  would continue with IV rocephin post-op  for emergent OR with Dr. Tay secondary to obstructing stone  f/u blood/urine cultures  s/p IV zosyn in ED and >30cc/kg NS boluses  continue maintenance fluids post operative  lactate trended up, check repeat lactate post operatively  continue with IV rocephin  for emergent OR with Dr. Tay

## 2019-10-02 NOTE — CHART NOTE - NSCHARTNOTEFT_GEN_A_CORE
Called for positive blood cx with GNR. Patient admitted with sepsis secondary to left obstructing ureteral stone. S/p stent placement with urology today. Patient remains with borderline blood pressures. On rocephin and IV fluids. Had received >30cc kg fluid resuscitation and remains on aggressive IVF resuscitation.    Vital Signs Last 24 Hrs  T(C): 37.6 (02 Oct 2019 16:31), Max: 39.2 (02 Oct 2019 01:50)  T(F): 99.6 (02 Oct 2019 16:31), Max: 102.5 (02 Oct 2019 01:50)  HR: 75 (02 Oct 2019 16:31) (72 - 98)  BP: 94/56 (02 Oct 2019 16:31) (87/53 - 145/79)  BP(mean): 75 (02 Oct 2019 07:26) (65 - 75)  RR: 17 (02 Oct 2019 16:31) (16 - 20)  SpO2: 95% (02 Oct 2019 16:31) (94% - 100%)    impression: GNR septicemia urinary source likely 2/2 obstruction stone POD#0 stent placement  - continue IVF and rocephin  - check repeat lactate in AM  - repeat blood cultures ordered  - ID consulted Dr. Solares

## 2019-10-02 NOTE — H&P ADULT - NSHPREVIEWOFSYSTEMS_GEN_ALL_CORE
CONSTITUTIONAL: admits fever, chills, fatigue, weakness  HEENT: denies blurred vision, sore throat  SKIN: denies new lesions, rash  CARDIOVASCULAR: denies chest pain, chest pressure, palpitations  RESPIRATORY: denies shortness of breath, cough, sputum production  GASTROINTESTINAL: denies nausea, vomiting, diarrhea, abdominal pain  GENITOURINARY: denies dysuria, discharge  NEUROLOGICAL: denies numbness, headache, focal weakness  MUSCULOSKELETAL: denies new joint pain, muscle aches  HEMATOLOGIC: denies gross bleeding, bruising  LYMPHATICS: denies extremity swelling  PSYCHIATRIC: denies recent changes in anxiety, depression CONSTITUTIONAL: admits fever, chills, fatigue, weakness  HEENT: denies blurred vision, sore throat  SKIN: denies new lesions, rash  CARDIOVASCULAR: denies chest pain, chest pressure, palpitations  RESPIRATORY: denies shortness of breath, cough, sputum production  GASTROINTESTINAL: denies vomiting, diarrhea; admits left sided abdominal pain (more toward flank), admits constipation   GENITOURINARY: denies dysuria, discharge; admits left flank pain traveling to back  NEUROLOGICAL: denies numbness, headache, focal weakness  MUSCULOSKELETAL: denies new joint pain, muscle aches  HEMATOLOGIC: denies gross bleeding, bruising  LYMPHATICS: denies extremity swelling  PSYCHIATRIC: denies recent changes in anxiety, depression

## 2019-10-02 NOTE — BRIEF OPERATIVE NOTE - NSICDXBRIEFPROCEDURE_GEN_ALL_CORE_FT
PROCEDURES:  Insertion, ureteral stent 02-Oct-2019 06:52:42 left with retrograde pyelogram Mannie Tay

## 2019-10-02 NOTE — H&P ADULT - PROBLEM SELECTOR PLAN 3
states that she has a colon bacterial problem  on home esomeprazole  continue PPI post operatively on olmesartan 40mg daily at home  hold ARB at this time pre-operatively  resume with losartan therapeutic exchange post operatively  monitor routine hemodynamics BP soft on presentation  on olmesartan 40mg daily at home  hold ARB at this time pre-operatively  resume with losartan therapeutic exchange post operatively when BP improves  monitor routine hemodynamics

## 2019-10-02 NOTE — H&P ADULT - NSICDXFAMILYHX_GEN_ALL_CORE_FT
FAMILY HISTORY:  No pertinent family history in first degree relatives FAMILY HISTORY:  FH: type 2 diabetes, daughter

## 2019-10-02 NOTE — H&P ADULT - PROBLEM SELECTOR PLAN 2
IMPROVE VTE Individual Risk Assessment  RISK                                                                Points  [  ] Previous VTE                                                  3  [  ] Thrombophilia                                               2  [  ] Lower limb paralysis                                      2        (unable to hold up >15 seconds)    [  ] Current Cancer                                              2         (within 6 months)  [  ] Immobilization > 24 hrs                                1  [  ] ICU/CCU stay > 24 hours                              1  [ x ] Age > 60                                                      1    IMPROVE VTE Score: 1    IMPROVE Score 0-1: Low Risk, No VTE prophylaxis required for most patients, encourage ambulation. on olmesartan 40mg daily at home  hold ARB at this time pre-operatively  resume with losartan therapeutic exchange post operatively  monitor routine hemodynamics L obstructive uropathy 2/2 L ureteropelvic junction calculus on CT A/P  - Admit to F  - s/p NS boluses (total of 3.5 L), IV Zosyn x1 in the ED  - for OR with Dr. Tay this AM  - EKG NSR at 87bpm LAD ?anterior infarct no acute ischemic changes  - no history of cardiac problems, denies having echocardiogram, no cardiac sx  - No absolute medical contraindication to proposed procedure  - would continue with rocephin post operatively  - pain control PRN L obstructive uropathy 2/2 L ureteropelvic junction calculus on CT A/P  - Admit to F  - s/p NS boluses (total of 3.5 L), IV Zosyn x1 in the ED  - for OR with Dr. Tay this AM  - EKG NSR at 87bpm LAD ?anterior infarct no acute ischemic changes  - no history of cardiac problems, denies having echocardiogram, no cardiac sx  - No absolute medical contraindication to proposed procedure  - continue with rocephin  - f/u blood and urine cultures  - pain control PRN

## 2019-10-02 NOTE — H&P ADULT - HISTORY OF PRESENT ILLNESS
75 yo F with reported hx of "colon problem" presents with fever and LLQ abdominal pain. Patient is here on vacation from [facility].    In the ED, Vital Signs: T(F): 99.9 - 102.5, HR: 83 - 98, BP: 100/66 - 145/79, RR: 16, SpO2: 96% on RA. Labs showed leukocytosis WBC 15.46 with L shift, lactate 3.2 -> 4.4, BUN/Cr 13/1. U/A showed trace LE, small blood, otherwise negative.  CT A/P with IV contrast showed Left obstructive uropathy secondary to 1 cm left ureteropelvic junction calculus. 75 yo F with reported hx of "colon problem" presents with fever and LLQ abdominal pain. Patient is here on vacation from [facility].    In the ED, Vital Signs: T(F): 99.9 - 102.5, HR: 83 - 98, BP: 100/66 - 145/79, RR: 16, SpO2: 96% on RA. Labs showed leukocytosis WBC 15.46 with L shift, lactate 3.2 -> 4.4, BUN/Cr 13/1. U/A showed trace LE, small blood, otherwise negative. CT A/P with IV contrast showed Left obstructive uropathy secondary to 1 cm left ureteropelvic junction calculus. Patient received NS boluses (total of 3.5 L), IV Zosyn x1, Tylenol x1, Ibuprofen x1. 73 yo F with reported hx of "colon problem" (on esomeprazole) and HTN (on olmesartan) presents with fever and LLQ abdominal pain. Patient is here on vacation from [facility]. She has been having some on/off pain for 8 days now, thought to be related to her "colon bacteria problem." On 10/1 at 7PM she started having severe 9/10 left flank pain traveling to her back. Pain comes and goes. She admits to subjective fever and chills while at [employer]. She denies any cardiac history aside from hypertension. Currently her pain is controlled with medication 4/10 at present. She admits chills, headache, constipation and nausea. She denies chest pain, palpitations, visual changes, dyspnea, orthopnea, PND, dysuria or hematuria.    In the ED, Vital Signs: T(F): 99.9 - 102.5, HR: 83 - 98, BP: 100/66 - 145/79, RR: 16, SpO2: 96% on RA. Labs showed leukocytosis WBC 15.46 with L shift, lactate 3.2 -> 4.4, BUN/Cr 13/1. U/A showed trace LE, small blood, otherwise negative. CT A/P with IV contrast showed Left obstructive uropathy secondary to 1 cm left ureteropelvic junction calculus. EKG was NSR at 87 bpm with LAD ?anterior infarct with no acute ischemic changes. Patient received NS boluses (total of 3.5 L), IV Zosyn x1, Tylenol x1, Ibuprofen x1. 75 yo F with reported hx of "colon problem" (on esomeprazole) and HTN (on olmesartan) presents with fever and LLQ abdominal pain. Patient is here on vacation from [facility]. She has been having some on/off pain for 8 days now, thought to be related to her "colon bacteria problem." On 10/1 at 7PM she started having severe 9/10 left flank pain traveling to her back. Pain comes and goes. She admits to subjective fever and chills while at [employer]. She denies any cardiac history aside from hypertension. Currently her pain is controlled with medication 4/10 at present. She admits chills, headache, constipation and nausea. She denies chest pain, palpitations, visual changes, dyspnea, orthopnea, PND, dysuria or hematuria.    In the ED, Vital Signs: T(F): 99.9 - 102.5, HR: 83 - 98, BP: 100/66 - 145/79, RR: 16, SpO2: 96% on RA. Labs showed leukocytosis WBC 15.46 with L shift, lactate 3.2 -> 4.4, BUN/Cr 13/1. U/A showed trace LE, small blood, otherwise negative. CT A/P with IV contrast showed Left obstructive uropathy secondary to 1 cm left ureteropelvic junction calculus. CXR no acute disease. EKG was NSR at 87 bpm with LAD ?anterior infarct with no acute ischemic changes. Patient received NS boluses (total of 3.5 L), IV Zosyn x1, Tylenol x1, Ibuprofen x1. Labs, imaging and EKG all personally reviewed,

## 2019-10-02 NOTE — CONSULT NOTE ADULT - PROBLEM SELECTOR RECOMMENDATION 2
Increased lactate, wbc and fever. patient received Zosyn. Ceftriaxone ordered.  Emergent left ureteral stent insertion to be performed.

## 2019-10-02 NOTE — H&P ADULT - NSICDXPASTMEDICALHX_GEN_ALL_CORE_FT
PAST MEDICAL HISTORY:  No pertinent past medical history reported hx of "colon problem" PAST MEDICAL HISTORY:  Gastroesophageal reflux disease, esophagitis presence not specified     Hypertension

## 2019-10-02 NOTE — CHART NOTE - NSCHARTNOTEFT_GEN_A_CORE
Pt seen and evaluated at bedside. Pt was admitted this morning. Please see detailed H+P regarding treatment plan. At the time of my bedside eval pt is POD0 s/p ureteral stent placement.     Utilized  phone #[phone] for our bedside interaction. Pt reports bifrontal HA which she rates as 5/10. She can't remember the name of the medication which she normally takes at home but this isn't uncommon for her. Otherwise she denies complaints. Admits abd discomfort but no pain.     Examined    A/P:  - ureteral stone w/ early obstructive uropathy 2/2 1cm ureteropelvic junction calculus. monitor renal indices. f/u urine culture. c/w IV abx for now. symptomatically doing better than she was pre-op  - otherwise plan for detailed H+P  - urology f/u    28 minutes spent on encounter. Pt seen and evaluated at bedside. Pt was admitted this morning. Please see detailed H+P regarding treatment plan. At the time of my bedside eval pt is POD0 s/p ureteral stent placement.     Utilized  phone #[phone] for our bedside interaction. Pt reports bifrontal HA which she rates as 5/10. She can't remember the name of the medication which she normally takes at home but this isn't uncommon for her. Otherwise she denies complaints. Admits abd discomfort but no pain.     Examined    A/P:  - ureteral stone w/ early obstructive uropathy 2/2 1cm ureteropelvic junction calculus. monitor renal indices. f/u urine culture. c/w IV abx for now. symptomatically doing better than she was pre-op  - lactic acidosis likely 2/2 obstructing stone. s/p 3.5L fluid bolus in less than 24hrs. will caution w/ excessive fluids potentially causing iatrogenic volume overalod. continue w/ 125cc/hr for now  - otherwise plan per detailed H+P  - urology f/u    28 minutes spent on encounter.

## 2019-10-02 NOTE — H&P ADULT - PROBLEM SELECTOR PLAN 5
DVT ppx: SCDs for now; heparin subQ post-operatively    IMPROVE VTE Individual Risk Assessment  RISK                                                                Points  [  ] Previous VTE                                                  3  [  ] Thrombophilia                                               2  [  ] Lower limb paralysis                                      2        (unable to hold up >15 seconds)    [  ] Current Cancer                                              2         (within 6 months)  [  ] Immobilization > 24 hrs                                1  [  ] ICU/CCU stay > 24 hours                              1  [ x ] Age > 60                                                      1    IMPROVE VTE Score: 1    IMPROVE Score 0-1: Low Risk, No VTE prophylaxis required for most patients, encourage ambulation.

## 2019-10-02 NOTE — H&P ADULT - ASSESSMENT
75 yo F with reported hx of "colon problem" presents with fever and LLQ abdominal pain, found to have L obstructive uropathy 2/2 L ureteropelvic junction calculus.    In the ED, Vital Signs: T(F): 99.9 - 102.5, HR: 83 - 98, BP: 100/66 - 145/79, RR: 16, SpO2: 96% on RA. Labs showed leukocytosis WBC 15.46 with L shift, lactate 3.2 -> 4.4, BUN/Cr 13/1. U/A showed trace LE, small blood, otherwise negative. CT A/P with IV contrast showed Left obstructive uropathy secondary to 1 cm left ureteropelvic junction calculus. Patient received NS boluses (total of 3.5 L), IV Zosyn x1, Tylenol x1, Ibuprofen x1. 73 yo F with reported hx of "colon problem" presents with fever and LLQ abdominal pain, found to have L obstructive uropathy 2/2 L ureteropelvic junction calculus.

## 2019-10-02 NOTE — PATIENT PROFILE ADULT - NSTRANSFERBELONGINGSDISPO_GEN_A_NUR
"Subjective:       Patient ID: Soledad Serrano is a 46 y.o. female.    Vitals:  height is 5' 4" (1.626 m) and weight is 54.4 kg (120 lb). Her oral temperature is 97.2 °F (36.2 °C). Her blood pressure is 152/100 (abnormal) and her pulse is 76. Her respiration is 18 and oxygen saturation is 98%.     Chief Complaint: Facial Injury    Patient states on 12/28/2017 she was by some friends house. Patient states she hit her nose on a glass door. Patient states no loss of consciousness. Hx of nasal fracture and septal deviation as a child      Facial Injury    The incident occurred 5 to 7 days ago. Injury mechanism: hit a door. There was no loss of consciousness. There was no blood loss. The quality of the pain is described as aching. The pain is at a severity of 4/10. The pain is mild. The pain has been constant since the injury. Pertinent negatives include no blurred vision, headaches, numbness or weakness. She has tried NSAIDs and ice for the symptoms. The treatment provided mild relief.     Review of Systems   Constitution: Negative for weakness and malaise/fatigue.   HENT: Negative for nosebleeds.    Eyes: Negative for blurred vision.   Cardiovascular: Negative for chest pain and syncope.   Respiratory: Negative for shortness of breath.    Musculoskeletal: Positive for joint pain and joint swelling. Negative for back pain and neck pain.   Gastrointestinal: Negative for abdominal pain.   Genitourinary: Negative for hematuria.   Neurological: Negative for dizziness, headaches and numbness.       Objective:      Physical Exam   Constitutional: She appears well-developed and well-nourished.   HENT:   Head: Normocephalic.   Nose: Nasal deformity (abrasion noted  on bridge) and septal deviation (to right) present. No nasal septal hematoma.           Assessment:       1. Contusion of nose, initial encounter        Plan:         Contusion of nose, initial encounter  -     CT Maxillofacial W WO Contrast; Future; Expected date: " 01/04/2018      To see PCP regarding possible CT to ruleout fracture    with patient

## 2019-10-02 NOTE — H&P ADULT - NSHPPHYSICALEXAM_GEN_ALL_CORE
T(C): 37.7 (10-02-19 @ 05:24), Max: 39.2 (10-02-19 @ 01:50)  HR: 83 (10-02-19 @ 05:24) (83 - 98)  BP: 100/66 (10-02-19 @ 05:24) (100/66 - 145/79)  RR: 16 (10-02-19 @ 05:24) (16 - 18)  SpO2: 96% (10-02-19 @ 05:24) (94% - 96%)    GENERAL: patient appears in no acute distress  EYES: sclera clear, no exudates  ENMT: oropharynx clear without erythema, no exudates, moist mucous membranes  NECK: supple, soft  LUNGS: good air entry bilaterally, clear to auscultation, symmetric breath sounds, no wheezing or rhonchi appreciated  HEART: soft S1/S2, regular rate and rhythm, no murmurs noted, no lower extremity edema  GASTROINTESTINAL: abdomen is soft, tender to palpation, nondistended, normoactive bowel sounds  INTEGUMENT: warm, well-perfused, good skin turgor  MUSCULOSKELETAL: no clubbing or cyanosis, no obvious deformity  NEUROLOGIC: awake, alert, oriented x3, good muscle tone in 4 extremities, no obvious sensory deficits  HEME/LYMPH: no obvious ecchymosis or petechiae T(C): 37.7 (10-02-19 @ 05:24), Max: 39.2 (10-02-19 @ 01:50)  HR: 83 (10-02-19 @ 05:24) (83 - 98)  BP: 100/66 (10-02-19 @ 05:24) (100/66 - 145/79)  RR: 16 (10-02-19 @ 05:24) (16 - 18)  SpO2: 96% (10-02-19 @ 05:24) (94% - 96%)    GENERAL: patient appears in no acute distress  EYES: sclera clear, no exudates  ENMT: oropharynx clear without erythema, no exudates, moist mucous membranes  NECK: supple, soft  LUNGS: good air entry bilaterally, clear to auscultation, symmetric breath sounds, no wheezing or rhonchi appreciated  HEART: soft S1/S2, regular rate and rhythm, no murmurs noted, no lower extremity edema  GASTROINTESTINAL: abdomen is soft, mildly tender to palpationin LUQ, nondistended, normoactive bowel sounds  GENITOURINARY: LEFT CVA tenderness  INTEGUMENT: warm, well-perfused, good skin turgor  MUSCULOSKELETAL: no clubbing or cyanosis, no obvious deformity  NEUROLOGIC: awake, alert, oriented x3, good muscle tone in 4 extremities, no obvious sensory deficits  HEME/LYMPH: no obvious ecchymosis or petechiae

## 2019-10-03 LAB
ANION GAP SERPL CALC-SCNC: 7 MMOL/L — SIGNIFICANT CHANGE UP (ref 5–17)
BASOPHILS # BLD AUTO: 0.22 K/UL — HIGH (ref 0–0.2)
BASOPHILS NFR BLD AUTO: 1 % — SIGNIFICANT CHANGE UP (ref 0–2)
BUN SERPL-MCNC: 10 MG/DL — SIGNIFICANT CHANGE UP (ref 7–23)
CALCIUM SERPL-MCNC: 8.3 MG/DL — LOW (ref 8.5–10.1)
CHLORIDE SERPL-SCNC: 114 MMOL/L — HIGH (ref 96–108)
CO2 SERPL-SCNC: 23 MMOL/L — SIGNIFICANT CHANGE UP (ref 22–31)
CREAT SERPL-MCNC: 0.71 MG/DL — SIGNIFICANT CHANGE UP (ref 0.5–1.3)
CULTURE RESULTS: NO GROWTH — SIGNIFICANT CHANGE UP
EOSINOPHIL # BLD AUTO: 0.22 K/UL — SIGNIFICANT CHANGE UP (ref 0–0.5)
EOSINOPHIL NFR BLD AUTO: 1 % — SIGNIFICANT CHANGE UP (ref 0–6)
GLUCOSE SERPL-MCNC: 118 MG/DL — HIGH (ref 70–99)
GRAM STN FLD: SIGNIFICANT CHANGE UP
GRAM STN FLD: SIGNIFICANT CHANGE UP
HCT VFR BLD CALC: 31.3 % — LOW (ref 34.5–45)
HCV AB S/CO SERPL IA: 0.2 S/CO — SIGNIFICANT CHANGE UP (ref 0–0.99)
HCV AB SERPL-IMP: SIGNIFICANT CHANGE UP
HGB BLD-MCNC: 10.6 G/DL — LOW (ref 11.5–15.5)
LACTATE SERPL-SCNC: 1.8 MMOL/L — SIGNIFICANT CHANGE UP (ref 0.7–2)
LYMPHOCYTES # BLD AUTO: 10 % — LOW (ref 13–44)
LYMPHOCYTES # BLD AUTO: 2.24 K/UL — SIGNIFICANT CHANGE UP (ref 1–3.3)
MAGNESIUM SERPL-MCNC: 1.5 MG/DL — LOW (ref 1.6–2.6)
MCHC RBC-ENTMCNC: 31.8 PG — SIGNIFICANT CHANGE UP (ref 27–34)
MCHC RBC-ENTMCNC: 33.9 GM/DL — SIGNIFICANT CHANGE UP (ref 32–36)
MCV RBC AUTO: 94 FL — SIGNIFICANT CHANGE UP (ref 80–100)
MONOCYTES # BLD AUTO: 0.22 K/UL — SIGNIFICANT CHANGE UP (ref 0–0.9)
MONOCYTES NFR BLD AUTO: 1 % — LOW (ref 2–14)
NEUTROPHILS # BLD AUTO: 19.5 K/UL — HIGH (ref 1.8–7.4)
NEUTROPHILS NFR BLD AUTO: 67 % — SIGNIFICANT CHANGE UP (ref 43–77)
NRBC # BLD: SIGNIFICANT CHANGE UP /100 WBCS (ref 0–0)
PHOSPHATE SERPL-MCNC: 2.2 MG/DL — LOW (ref 2.5–4.5)
PLATELET # BLD AUTO: 108 K/UL — LOW (ref 150–400)
POTASSIUM SERPL-MCNC: 3.3 MMOL/L — LOW (ref 3.5–5.3)
POTASSIUM SERPL-SCNC: 3.3 MMOL/L — LOW (ref 3.5–5.3)
RBC # BLD: 3.33 M/UL — LOW (ref 3.8–5.2)
RBC # FLD: 14 % — SIGNIFICANT CHANGE UP (ref 10.3–14.5)
SODIUM SERPL-SCNC: 144 MMOL/L — SIGNIFICANT CHANGE UP (ref 135–145)
SPECIMEN SOURCE: SIGNIFICANT CHANGE UP
WBC # BLD: 22.41 K/UL — HIGH (ref 3.8–10.5)
WBC # FLD AUTO: 22.41 K/UL — HIGH (ref 3.8–10.5)

## 2019-10-03 PROCEDURE — 74018 RADEX ABDOMEN 1 VIEW: CPT | Mod: 26

## 2019-10-03 PROCEDURE — 99233 SBSQ HOSP IP/OBS HIGH 50: CPT

## 2019-10-03 RX ORDER — MEROPENEM 1 G/30ML
INJECTION INTRAVENOUS
Refills: 0 | Status: DISCONTINUED | OUTPATIENT
Start: 2019-10-03 | End: 2019-10-04

## 2019-10-03 RX ORDER — MAGNESIUM SULFATE 500 MG/ML
2 VIAL (ML) INJECTION ONCE
Refills: 0 | Status: COMPLETED | OUTPATIENT
Start: 2019-10-03 | End: 2019-10-03

## 2019-10-03 RX ORDER — MEROPENEM 1 G/30ML
1000 INJECTION INTRAVENOUS EVERY 8 HOURS
Refills: 0 | Status: DISCONTINUED | OUTPATIENT
Start: 2019-10-03 | End: 2019-10-04

## 2019-10-03 RX ORDER — SODIUM CHLORIDE 9 MG/ML
1000 INJECTION, SOLUTION INTRAVENOUS
Refills: 0 | Status: DISCONTINUED | OUTPATIENT
Start: 2019-10-03 | End: 2019-10-06

## 2019-10-03 RX ORDER — MEROPENEM 1 G/30ML
1000 INJECTION INTRAVENOUS ONCE
Refills: 0 | Status: COMPLETED | OUTPATIENT
Start: 2019-10-03 | End: 2019-10-03

## 2019-10-03 RX ADMIN — TRAMADOL HYDROCHLORIDE 25 MILLIGRAM(S): 50 TABLET ORAL at 09:53

## 2019-10-03 RX ADMIN — CEFTRIAXONE 100 MILLIGRAM(S): 500 INJECTION, POWDER, FOR SOLUTION INTRAMUSCULAR; INTRAVENOUS at 05:59

## 2019-10-03 RX ADMIN — Medication 1000 MILLIGRAM(S): at 19:28

## 2019-10-03 RX ADMIN — Medication 200 MILLIGRAM(S): at 21:47

## 2019-10-03 RX ADMIN — MEROPENEM 100 MILLIGRAM(S): 1 INJECTION INTRAVENOUS at 21:47

## 2019-10-03 RX ADMIN — Medication 85 MILLIMOLE(S): at 12:23

## 2019-10-03 RX ADMIN — TRAMADOL HYDROCHLORIDE 25 MILLIGRAM(S): 50 TABLET ORAL at 10:50

## 2019-10-03 RX ADMIN — SODIUM CHLORIDE 125 MILLILITER(S): 9 INJECTION, SOLUTION INTRAVENOUS at 05:58

## 2019-10-03 RX ADMIN — Medication 50 GRAM(S): at 10:49

## 2019-10-03 RX ADMIN — SODIUM CHLORIDE 75 MILLILITER(S): 9 INJECTION, SOLUTION INTRAVENOUS at 10:49

## 2019-10-03 RX ADMIN — MEROPENEM 100 MILLIGRAM(S): 1 INJECTION INTRAVENOUS at 09:27

## 2019-10-03 RX ADMIN — Medication 1000 MILLIGRAM(S): at 18:44

## 2019-10-03 RX ADMIN — Medication 200 MILLIGRAM(S): at 05:58

## 2019-10-03 RX ADMIN — Medication 200 MILLIGRAM(S): at 13:30

## 2019-10-03 RX ADMIN — PANTOPRAZOLE SODIUM 40 MILLIGRAM(S): 20 TABLET, DELAYED RELEASE ORAL at 05:58

## 2019-10-03 RX ADMIN — MEROPENEM 100 MILLIGRAM(S): 1 INJECTION INTRAVENOUS at 13:25

## 2019-10-03 NOTE — PROGRESS NOTE ADULT - SUBJECTIVE AND OBJECTIVE BOX
Patient is a 74y old  Female who presents with a chief complaint of L obstructive uropathy 2/2 L ureteropelvic junction calculus (03 Oct 2019 12:19)      FROM ADMISSION H+P:   HPI:  75 yo F with reported hx of "colon problem" (on esomeprazole) and HTN (on olmesartan) presents with fever and LLQ abdominal pain. Patient is here on vacation from [facility]. She has been having some on/off pain for 8 days now, thought to be related to her "colon bacteria problem." On 10/1 at 7PM she started having severe 9/10 left flank pain traveling to her back. Pain comes and goes. She admits to subjective fever and chills while at [employer]. She denies any cardiac history aside from hypertension. Currently her pain is controlled with medication 4/10 at present. She admits chills, headache, constipation and nausea. She denies chest pain, palpitations, visual changes, dyspnea, orthopnea, PND, dysuria or hematuria.    In the ED, Vital Signs: T(F): 99.9 - 102.5, HR: 83 - 98, BP: 100/66 - 145/79, RR: 16, SpO2: 96% on RA. Labs showed leukocytosis WBC 15.46 with L shift, lactate 3.2 -> 4.4, BUN/Cr 13/1. U/A showed trace LE, small blood, otherwise negative. CT A/P with IV contrast showed Left obstructive uropathy secondary to 1 cm left ureteropelvic junction calculus. CXR no acute disease. EKG was NSR at 87 bpm with LAD ?anterior infarct with no acute ischemic changes. Patient received NS boluses (total of 3.5 L), IV Zosyn x1, Tylenol x1, Ibuprofen x1. Labs, imaging and EKG all personally reviewed, (02 Oct 2019 05:24)      ----  Utilized  phone #[phone] for our interaction. Pt's nephew @ bedside as well.   INTERVAL HPI/OVERNIGHT EVENTS: Pt seen and evaluated at the bedside. No acute overnight events occurred. Pt reports mild HA today which was worse this morning and worse when she was febrile. Her family states that she's been feeling better throughout the day since her last fever which she peaked at 100.1F last 24hrs. She reports L sided upper abd discomfort which is sharp and episodic and "comes and goes" not reproducible to palpation. She states that this also is improving this afternoon. She is tolerating diet. Urinating well. Has had 4 BM's in last 1-2 days.     ----  PAST MEDICAL & SURGICAL HISTORY:  Gastroesophageal reflux disease, esophagitis presence not specified  Hypertension  No significant past surgical history      FAMILY HISTORY:  FH: type 2 diabetes: daughter      Allergies    No Known Allergies    Intolerances        ----  REVIEW OF SYSTEMS:  CONSTITUTIONAL: admits fever this morning adn chills during fever  CARDIOVASCULAR: denies chest pain, chest pressure, palpitations  RESPIRATORY: denies shortness of breath, sputum production  GASTROINTESTINAL: denies nausea, vomiting, diarrhea, admits some mild abd pain as per HPI  GENITOURINARY: denies dysuria, discharge, urgency, frequency  NEUROLOGICAL: denies numbness, headache, focal weakness  MUSCULOSKELETAL: denies new joint pain, muscle aches  HEMATOLOGIC: denies gross bleeding, bruising  LYMPHATICS: denies enlarged lymph nodes, extremity swelling     ----  PHYSICAL EXAM:  GENERAL: patient appears tired but comfortable  ENMT: oropharynx clear without erythema, moist mucous membranes  NECK: supple, soft, no thyromegaly noted  LUNGS: good air entry bilaterally, clear to auscultation, symmetric breath sounds, no wheezing or rhonchi appreciated  HEART: soft S1/S2, regular rate and rhythm, no murmurs noted, no noted edema to b/l LE  GASTROINTESTINAL: abdomen is soft, minimal tenderness noted to L flank which is not reproducible to palpation, nondistended, normoactive bowel sounds, no palpable masses  INTEGUMENT: good skin turgor, appropriate for ethnicity, appears well perfused, no jaundice noted  MUSCULOSKELETAL: no clubbing or cyanosis, no obvious deformity  NEUROLOGIC: awake, alert, oriented x3, good muscle tone in 4 extremities, no obvious sensory deficits    T(C): 37.1 (10-03-19 @ 12:31), Max: 37.8 (10-02-19 @ 23:56)  HR: 65 (10-03-19 @ 12:04) (65 - 74)  BP: 112/74 (10-03-19 @ 12:04) (101/67 - 113/74)  RR: 18 (10-03-19 @ 12:04) (17 - 20)  SpO2: 92% (10-03-19 @ 12:04) (92% - 96%)  Wt(kg): --    ----  I&O's Summary    02 Oct 2019 07:01  -  03 Oct 2019 07:00  --------------------------------------------------------  IN: 3885 mL / OUT: 1500 mL / NET: 2385 mL    03 Oct 2019 07:01  -  03 Oct 2019 16:37  --------------------------------------------------------  IN: 1625 mL / OUT: 1800 mL / NET: -175 mL        LABS:                        10.6   22.41 )-----------( 108      ( 03 Oct 2019 06:55 )             31.3     10-03    144  |  114<H>  |  10  ----------------------------<  118<H>  3.3<L>   |  23  |  0.71    Ca    8.3<L>      03 Oct 2019 06:55  Phos  2.2     10-03  Mg     1.5     10-03    TPro  8.1  /  Alb  3.7  /  TBili  0.6  /  DBili  x   /  AST  64<H>  /  ALT  56  /  AlkPhos  63  10-01    PT/INR - ( 01 Oct 2019 23:18 )   PT: 12.3 sec;   INR: 1.09 ratio         PTT - ( 01 Oct 2019 23:18 )  PTT:26.7 sec  Urinalysis Basic - ( 02 Oct 2019 04:37 )    Color: Pale Yellow / Appearance: Clear / S.005 / pH: x  Gluc: x / Ketone: Negative  / Bili: Negative / Urobili: Negative   Blood: x / Protein: Negative / Nitrite: Negative   Leuk Esterase: Trace / RBC: 0-2 /HPF / WBC 0-2   Sq Epi: x / Non Sq Epi: Few / Bacteria: x      CAPILLARY BLOOD GLUCOSE          10-02 @ 09:12   No growth  --  --  10-02 @ 09:02   Growth in anaerobic bottle: Gram Negative Rods  Growth in aerobic bottle: Gram Negative Rods  --  Blood Culture PCR            ----  Personally reviewed:  Vital sign trends: [ x ] yes    [  ] no     [  ] n/a  Laboratory results: [ x ] yes    [  ] no     [  ] n/a  Radiology results: [ x ] yes    [  ] no     [  ] n/a  Culture results: [ x ] yes    [  ] no     [  ] n/a  Consultant recommendations: [ x ] yes    [  ] no     [  ] n/a

## 2019-10-03 NOTE — CONSULT NOTE ADULT - REASON FOR ADMISSION
L obstructive uropathy 2/2 L ureteropelvic junction calculus
L obstructive uropathy 2/2 L ureteropelvic junction calculus

## 2019-10-03 NOTE — CONSULT NOTE ADULT - SUBJECTIVE AND OBJECTIVE BOX
Infectious Diseases Consult by Karen Solares MD    Reason for Consult :    HPI: All hx per chart and her Grand son present at bedside   73 yo H F visiting with reported hx of "colon problem" (on esomeprazole) ? h pylori as had EGD and HTN (on olmesartan) presents with fever and LLQ abdominal pain. Patient is here on vacation from [facility]. She has been having some on/off pain for 8 days now, thought to be related to her "colon bacteria problem." On 10/1 at 7PM she started having severe 9/10 left flank pain traveling to her back. Pain comes and goes. She admits to subjective fever and chills while at [employer]. She denies any cardiac history aside from hypertension. Currently her pain is controlled with medication /10 at present. She admits chills, headache, constipation and nausea. She denies chest pain, palpitations, visual changes, dyspnea, orthopnea, PND, dysuria or hematuria.    In the ED, Vital Signs: T(F): 99.9 - 102.5, HR: 83 - 98, BP: 100/66 - 145/79, RR: 16, SpO2: 96% on RA. Labs showed leukocytosis WBC 15.46 with L shift, lactate 3.2 -> 4.4, BUN/Cr 13/1. U/A showed trace LE, small blood, otherwise negative. CT A/P with IV contrast showed Left obstructive uropathy secondary to 1 cm left ureteropelvic junction calculus. CXR no acute disease. EKG was NSR at 87 bpm with LAD ?anterior infarct with no acute ischemic changes. Patient received NS boluses (total of 3.5 L), IV Zosyn x1, Tylenol x1, Ibuprofen x1. Labs, imaging and EKG all personally reviewed, (02 Oct 2019 05:24)    Blood cs 2/2 pos for GNR identified as Enterobacter by PCR , urine cs is negative , she underwent urgent cystoscopy and left ureteral stent placement by Dr. Tay last evening . 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   EtOH: denies   Tobacco: ex smoker quit over 30 years ago   Drug Use: denies     FAMILY HISTORY:  FH: type 2 diabetes: daughter      Allergies    No Known Allergies    Intolerances        Home/ Out patient  Medications :  Home Medications:  esomeprazole 40 mg oral delayed release capsule: 1 cap(s) orally once a day (02 Oct 2019 05:56)  olmesartan 40 mg oral tablet: 1 tab(s) orally once a day (02 Oct 2019 05:56)      Current Inpatient Medications :    ANTIBIOTICS:   meropenem  IVPB 1000 milliGRAM(s) IV Intermittent every 8 hours  meropenem  IVPB          OTHER RELEVANT MEDICATIONS :  acetaminophen   Tablet .. 650 milliGRAM(s) Oral every 6 hours PRN  acetaminophen   Tablet .. 1000 milliGRAM(s) Oral every 6 hours PRN  lactated ringers 1000 milliLiter(s) IV Continuous <Continuous>  pantoprazole    Tablet 40 milliGRAM(s) Oral before breakfast  phenazopyridine 200 milliGRAM(s) Oral three times a day  sodium phosphate IVPB 30 milliMole(s) IV Intermittent once  traMADol 25 milliGRAM(s) Oral three times a day PRN      ROS:  CONSTITUTIONAL:  Pos for  fever or chills, feels well, good appetite  EYES:  Negative  blurry vision or double vision  CARDIOVASCULAR:  Negative for chest pain or palpitations  RESPIRATORY:  Negative for cough, wheezing, or SOB   GASTROINTESTINAL:  Negative for nausea, vomiting, diarrhea, constipation, or abdominal pain  GENITOURINARY:  Positive for flank  pain,  frequency, urgency , dysuria and  hematuria   NEUROLOGIC:  No headache, confusion, dizziness, lightheadedness  All other systems were reviewed and are negative          I&O's Detail    02 Oct 2019 07:  -  03 Oct 2019 07:00  --------------------------------------------------------  IN:    lactated ringers.: 2125 mL    Oral Fluid: 1060 mL    Sodium Chloride 0.9% IV Bolus: 700 mL  Total IN: 3885 mL    OUT:    Voided: 1500 mL  Total OUT: 1500 mL    Total NET: 2385 mL      03 Oct 2019 07:  -  03 Oct 2019 12:20  --------------------------------------------------------  IN:    lactated ringers.: 375 mL    Solution: 50 mL  Total IN: 425 mL    OUT:    Voided: 600 mL  Total OUT: 600 mL    Total NET: -175 mL          Physical Exam:  Vital Signs Last 24 Hrs  T(C): 37.8 (03 Oct 2019 12:04), Max: 37.8 (02 Oct 2019 23:56)  T(F): 100.1 (03 Oct 2019 12:04), Max: 100.1 (02 Oct 2019 23:56)  HR: 65 (03 Oct 2019 12:04) (65 - 81)  BP: 112/74 (03 Oct 2019 12:04) (91/58 - 113/74)  BP(mean): --  RR: 18 (03 Oct 2019 12:04) (16 - 20)  SpO2: 92% (03 Oct 2019 12:04) (92% - 96%)      General: well developed well nourished, in no acute distress  Eyes: sclera anicteric, pupils equal and reactive to light  ENMT: buccal mucosa moist, pharynx not injected  Neck: supple, trachea midline  Lungs: clear, no wheeze/rhonchi  Cardiovascular: regular rate and rhythm, S1 S2  Abdomen: soft, nontender, no organomegaly present, bowel sounds normal, left CVA tenderness   Neurological:  alert and oriented x3, Cranial Nerves II-XII grossly intact  Skin: no increased ecchymosis/petechiae/purpura  Lymph Nodes: no palpable cervical/supraclavicular lymph nodes enlargements  Extremities: no cyanosis/clubbing/edema    Labs:                 10.6   22.41  )----------(  108       ( 03 Oct 2019 06:55 )               31.3     Manual Differential (10.03.19 @ 06:55)    Band Neutrophils %: 20.0 %      144    |  114    |  10     ----------------------------<  118        ( 03 Oct 2019 06:55 )  3.3     |  23     |  0.71     Ca    8.3        ( 03 Oct 2019 06:55 )  Phos  2.2       ( 03 Oct 2019 06:55 )  Mg     1.5       ( 03 Oct 2019 06:55 )    Lactate, Blood: 1.8 mmol/L (10-03-19 @ 06:55)  Lactate, Blood: 4.4 mmol/L (10-02-19 @ 14:04)  Lactate, Blood: 5.9 mmol/L (10-02-19 @ 08:44)  Lactate, Blood: 4.4 mmol/L (10-02-19 @ 02:58)  Lactate, Blood: 3.2 mmol/L (10-01-19 @ 23:18)    Urinalysis Basic - ( 02 Oct 2019 04:37 )    Color: Pale Yellow / Appearance: Clear / S.005 / pH: x  Gluc: x / Ketone: Negative  / Bili: Negative / Urobili: Negative   Blood: x / Protein: Negative / Nitrite: Negative   Leuk Esterase: Trace / RBC: 0-2 /HPF / WBC 0-2   Sq Epi: x / Non Sq Epi: Few / Bacteria: x          RECENT CULTURES:    Culture - Urine (collected 10-02-19 @ 09:12)  Source: .Urine Clean Catch (Midstream)  Final Report (10-03-19 @ 05:12):    No growth    Culture - Blood (collected 10-02-19 @ 09:02)  Source: .Blood Blood-Peripheral  Gram Stain (10-03-19 @ 01:50):    Growth in anaerobic bottle: Gram Negative Rods    Growth in aerobic bottle: Gram Negative Rods  Preliminary Report (10-03-19 @ 01:51):    Growth in anaerobic bottle: Gram Negative Rods    Growth in aerobic bottle: Gram Negative Rods    "Due to technical problems, Proteus sp. will Not be reported as part of    the BCID panel until further notice"    ***Blood Panel PCR results on this specimenare available    approximately 3 hours after the Gram stain result.***    Gram stain, PCR, and/or culture results may not always    correspond due to difference in methodologies.    ************************************************************    This PCR assaywas performed using [employer].    The following targets are tested for: Enterococcus,    vancomycin resistant enterococci, Listeria monocytogenes,    coagulase negative staphylococci, S. aureus,    methicillin resistant S. aureus, Streptococcus agalactiae    (Group B), S. pneumoniae, S. pyogenes (Group A),    Acinetobacter baumannii, Enterobacter cloacae, E. coli,    Klebsiella oxytoca, K. pneumoniae, Proteus sp.,    Serratia marcescens, Haemophilus influenzae,    Neisseria meningitidis, Pseudomonas aeruginosa, Candida    albicans, C. glabrata, C krusei, C parapsilosis,    C. tropicalis and the KPC resistance gene.  Organism: Blood Culture PCR (10-02-19 @ 20:53)  Organism: Blood Culture PCR (10-02-19 @ 20:53)      -  Enterobacter cloacae complex: Detec      Method Type: PCR    Culture - Blood (collected 10-02-19 @ 09:02)  Source: .Blood Blood-Peripheral  Gram Stain (10-03-19 @ 04:01):    Growth in anaerobic bottle: Gram Negative Rods    Growth in aerobic bottle: Gram Negative Rods  Preliminary Report (10-03-19 @ 04:01):    Growth in anaerobic bottle: Gram Negative Rods    Growth in aerobic bottle: Gram Negative Rods      RADIOLOGY & ADDITIONAL STUDIES:  Xray Kidney Ureter Bladder (10.03.19 @ 08:07) >  Left double-J ureteral stent noted in situ. Left upper quadrant   calcification adjacent to the stent consistent withthe largest left UPJ   calculus noted on recent CT. No other abnormal calcareous opacities.   Unremarkable bowel gas pattern. Degenerative change and dextroscoliosis   lumbar spine.    Impression: Status post left ureteral stent. Calcific left UPJ calculus     Xray Chest 1 View-PORTABLE IMMEDIATE (10.02.19 @ 05:01) >  Findings:  Lines: None    Heart/Mediastinum/Lungs: The heart size is enlarged.The lungs are   clear.There are no pleural effusions.    Impression:    Clear lungs.    CT Abdomen and Pelvis w/ IV Cont (10.02.19 @ 03:53) >  FINDINGS:    LOWER CHEST: Within normal limits.    HEPATOBILIARY: Within normal limits.  PANCREAS: Within normal limits.  SPLEEN: Within normal limits.  ADRENALS: Within normal limits.    KIDNEYS/URETERS/BLADDER: 1 cm left ureteropelvic junction calculus   causing moderate hydronephrosis, delayed nephrogram and mild perinephric   fat stranding with trace pararenal and left paracolic gutter ascites.   Right kidney, ureter and bladder are within normal limits.  REPRODUCTIVE ORGANS: Within normal limits.    BOWEL/PERITONEUM: No bowel obstruction or pneumoperitoneum.  Appendix not   clearly visualized, however no pericecal inflammation. Portions of the   gastrointestinal tract are collapsed, limiting evaluation.     VESSELS:  Within normal limits.  LYMPHATICS/RETROPERITONEUM: No lymphadenopathy. No retroperitoneal   hematoma.      SOFT TISSUES: Within normal limits.  BONES: Multilevel spinal degenerative changes.    IMPRESSION: Left obstructive uropathy secondary to 1 cm left   ureteropelvic junction calculus. Urology follow-up recommended.        Assessment :     74 year old admitted with left renal colic with severe gram sepsis based on criteria secondary to obstructive uropathy , she is s/p emergent Cysto and stent placement . She has a 1 cm Left UPJ stone, may have pyelonephritis .Post op has bandemia and leukocytosis likely related to sepsis    Plan :   - will continue with IV meropenem pending final cs then de escalate   - repeat blood cs done  - trend cbc   - continue with hydration       Continue with present regime .  Appropriate use of antibiotics and adverse effects reviewed.      I have discussed the above plan of care with patient and her family in detail. They expressed understanding of the treatment plan . Risks, benefits and alternatives discussed in detail. I have asked if they have any questions or concerns and appropriately addressed them to the best of my ability .      > 55 minutes spent in direct patient care reviewing  the notes, lab data/ imaging , discussion with multidisciplinary team. All questions were addressed and answered to the best of my capacity .    Thank you for allowing me to participate in the care of your patient .      Karen Solares MD  [phone]

## 2019-10-03 NOTE — PROGRESS NOTE ADULT - SUBJECTIVE AND OBJECTIVE BOX
DEPARTMENT OF ANESTHESIA  POST ANESTHETIC EVALUATION    The Patient was evaluated    Vital Signs Last 24 Hrs  T(C): 37.3 (03 Oct 2019 07:15), Max: 37.8 (02 Oct 2019 23:56)  T(F): 99.2 (03 Oct 2019 07:15), Max: 100.1 (02 Oct 2019 23:56)  HR: 65 (03 Oct 2019 07:15) (65 - 81)  BP: 113/74 (03 Oct 2019 07:15) (91/58 - 113/74)  BP(mean): --  RR: 18 (03 Oct 2019 07:15) (16 - 20)  SpO2: 96% (03 Oct 2019 07:15) (94% - 96%)    Evaluation:      (x ) No apparent complications  regarding anesthesia care at this time    Condition:  (x ) Stable      ( ) Guarded      ( ) Critical    Recommendations:  (x ) None     ( ) Other:

## 2019-10-03 NOTE — PROGRESS NOTE ADULT - SUBJECTIVE AND OBJECTIVE BOX
INTERVAL HPI/OVERNIGHT EVENTS: c/o LLQ pain via family member, and chills    MEDICATIONS  (STANDING):  cefTRIAXone   IVPB 1000 milliGRAM(s) IV Intermittent every 24 hours  lactated ringers. 1000 milliLiter(s) (125 mL/Hr) IV Continuous <Continuous>  pantoprazole    Tablet 40 milliGRAM(s) Oral before breakfast  phenazopyridine 200 milliGRAM(s) Oral three times a day    MEDICATIONS  (PRN):  acetaminophen   Tablet .. 650 milliGRAM(s) Oral every 6 hours PRN Temp greater or equal to 38C (100.4F), Mild Pain (1 - 3)  acetaminophen   Tablet .. 1000 milliGRAM(s) Oral every 6 hours PRN Moderate Pain (4 - 6)  traMADol 25 milliGRAM(s) Oral three times a day PRN Severe Pain (7 - 10)        Vital Signs Last 24 Hrs  T(C): 37.3 (03 Oct 2019 04:30), Max: 37.8 (02 Oct 2019 23:56)  T(F): 99.2 (03 Oct 2019 04:30), Max: 100.1 (02 Oct 2019 23:56)  HR: 74 (03 Oct 2019 04:30) (72 - 82)  BP: 104/62 (03 Oct 2019 04:30) (90/55 - 111/60)  BP(mean): 75 (02 Oct 2019 07:26) (75 - 75)  RR: 20 (03 Oct 2019 04:30) (16 - 20)  SpO2: 95% (03 Oct 2019 04:30) (94% - 100%)    PHYSICAL EXAM:    ABDOMEN: soft, mild LLQ tenderness, no rebound or guarding      LABS:                        11.3   9.69  )-----------( 121      ( 02 Oct 2019 08:44 )             34.3     10-02    145  |  114<H>  |  11  ----------------------------<  149<H>  3.4<L>   |  22  |  0.95    Ca    7.6<L>      02 Oct 2019 14:04    TPro  8.1  /  Alb  3.7  /  TBili  0.6  /  DBili  x   /  AST  64<H>  /  ALT  56  /  AlkPhos  63  10-01    PT/INR - ( 01 Oct 2019 23:18 )   PT: 12.3 sec;   INR: 1.09 ratio       Lactate, Blood (10.02. @ 14:04)    Lactate, Blood: 4.4 mmol/L      PTT - ( 01 Oct 2019 23:18 )  PTT:26.7 sec  Urinalysis Basic - ( 02 Oct 2019 04:37 )    Color: Pale Yellow / Appearance: Clear / S.005 / pH: x  Gluc: x / Ketone: Negative  / Bili: Negative / Urobili: Negative   Blood: x / Protein: Negative / Nitrite: Negative   Leuk Esterase: Trace / RBC: 0-2 /HPF / WBC 0-2   Sq Epi: x / Non Sq Epi: Few / Bacteria: x      Urine culture:  10-02 @ 09:12 --   No growth  Urine culture:  10-02 @ 09:02 --   Growth in anaerobic bottle: Gram Negative Rods  Growth in aerobic bottle: Gram Negative Rods

## 2019-10-03 NOTE — PROGRESS NOTE ADULT - ATTENDING COMMENTS
The tx plan was discussed in detail with the patient and family members at the bedside. Their questions and concerns were addressed to the best of my ability. They are in agreement with the plan as detailed above. They demonstrated adequate understanding of the counseling which I have provided. The tx plan was discussed in detail with the patient and family members at the bedside. Their questions and concerns were addressed to the best of my ability. They are in agreement with the plan as detailed above. They demonstrated adequate understanding of the counseling which I have provided.    Hypokalemia, supplement, f/u lab  Hypophosphatemia, supplement, f/u lab  Hypomagnesemia, supplement, f/u lab

## 2019-10-04 LAB
-  AMIKACIN: SIGNIFICANT CHANGE UP
-  AMPICILLIN/SULBACTAM: SIGNIFICANT CHANGE UP
-  AMPICILLIN: SIGNIFICANT CHANGE UP
-  AZTREONAM: SIGNIFICANT CHANGE UP
-  CEFAZOLIN: SIGNIFICANT CHANGE UP
-  CEFEPIME: SIGNIFICANT CHANGE UP
-  CEFOXITIN: SIGNIFICANT CHANGE UP
-  CEFTRIAXONE: SIGNIFICANT CHANGE UP
-  CIPROFLOXACIN: SIGNIFICANT CHANGE UP
-  ERTAPENEM: SIGNIFICANT CHANGE UP
-  GENTAMICIN: SIGNIFICANT CHANGE UP
-  IMIPENEM: SIGNIFICANT CHANGE UP
-  LEVOFLOXACIN: SIGNIFICANT CHANGE UP
-  MEROPENEM: SIGNIFICANT CHANGE UP
-  PIPERACILLIN/TAZOBACTAM: SIGNIFICANT CHANGE UP
-  TIGECYCLINE: SIGNIFICANT CHANGE UP
-  TOBRAMYCIN: SIGNIFICANT CHANGE UP
-  TRIMETHOPRIM/SULFAMETHOXAZOLE: SIGNIFICANT CHANGE UP
ALBUMIN SERPL ELPH-MCNC: 2.6 G/DL — LOW (ref 3.3–5)
ALP SERPL-CCNC: 65 U/L — SIGNIFICANT CHANGE UP (ref 40–120)
ALT FLD-CCNC: 28 U/L — SIGNIFICANT CHANGE UP (ref 12–78)
ANION GAP SERPL CALC-SCNC: 7 MMOL/L — SIGNIFICANT CHANGE UP (ref 5–17)
AST SERPL-CCNC: 24 U/L — SIGNIFICANT CHANGE UP (ref 15–37)
BASOPHILS # BLD AUTO: 0.04 K/UL — SIGNIFICANT CHANGE UP (ref 0–0.2)
BASOPHILS NFR BLD AUTO: 0.2 % — SIGNIFICANT CHANGE UP (ref 0–2)
BILIRUB DIRECT SERPL-MCNC: 0.1 MG/DL — SIGNIFICANT CHANGE UP (ref 0.05–0.2)
BILIRUB INDIRECT FLD-MCNC: 0.5 MG/DL — SIGNIFICANT CHANGE UP (ref 0.2–1)
BILIRUB SERPL-MCNC: 0.6 MG/DL — SIGNIFICANT CHANGE UP (ref 0.2–1.2)
BUN SERPL-MCNC: 7 MG/DL — SIGNIFICANT CHANGE UP (ref 7–23)
CALCIUM SERPL-MCNC: 8.3 MG/DL — LOW (ref 8.5–10.1)
CHLORIDE SERPL-SCNC: 110 MMOL/L — HIGH (ref 96–108)
CO2 SERPL-SCNC: 26 MMOL/L — SIGNIFICANT CHANGE UP (ref 22–31)
CREAT SERPL-MCNC: 0.6 MG/DL — SIGNIFICANT CHANGE UP (ref 0.5–1.3)
CULTURE RESULTS: SIGNIFICANT CHANGE UP
CULTURE RESULTS: SIGNIFICANT CHANGE UP
EOSINOPHIL # BLD AUTO: 0.29 K/UL — SIGNIFICANT CHANGE UP (ref 0–0.5)
EOSINOPHIL NFR BLD AUTO: 1.6 % — SIGNIFICANT CHANGE UP (ref 0–6)
GLUCOSE SERPL-MCNC: 117 MG/DL — HIGH (ref 70–99)
HCT VFR BLD CALC: 31.9 % — LOW (ref 34.5–45)
HGB BLD-MCNC: 10.8 G/DL — LOW (ref 11.5–15.5)
IMM GRANULOCYTES NFR BLD AUTO: 0.6 % — SIGNIFICANT CHANGE UP (ref 0–1.5)
LYMPHOCYTES # BLD AUTO: 16.2 % — SIGNIFICANT CHANGE UP (ref 13–44)
LYMPHOCYTES # BLD AUTO: 2.93 K/UL — SIGNIFICANT CHANGE UP (ref 1–3.3)
MAGNESIUM SERPL-MCNC: 1.9 MG/DL — SIGNIFICANT CHANGE UP (ref 1.6–2.6)
MCHC RBC-ENTMCNC: 31.2 PG — SIGNIFICANT CHANGE UP (ref 27–34)
MCHC RBC-ENTMCNC: 33.9 GM/DL — SIGNIFICANT CHANGE UP (ref 32–36)
MCV RBC AUTO: 92.2 FL — SIGNIFICANT CHANGE UP (ref 80–100)
METHOD TYPE: SIGNIFICANT CHANGE UP
MONOCYTES # BLD AUTO: 0.77 K/UL — SIGNIFICANT CHANGE UP (ref 0–0.9)
MONOCYTES NFR BLD AUTO: 4.3 % — SIGNIFICANT CHANGE UP (ref 2–14)
NEUTROPHILS # BLD AUTO: 13.9 K/UL — HIGH (ref 1.8–7.4)
NEUTROPHILS NFR BLD AUTO: 77.1 % — HIGH (ref 43–77)
NRBC # BLD: 0 /100 WBCS — SIGNIFICANT CHANGE UP (ref 0–0)
ORGANISM # SPEC MICROSCOPIC CNT: SIGNIFICANT CHANGE UP
PHOSPHATE SERPL-MCNC: 2.5 MG/DL — SIGNIFICANT CHANGE UP (ref 2.5–4.5)
PLATELET # BLD AUTO: 101 K/UL — LOW (ref 150–400)
POTASSIUM SERPL-MCNC: 3.2 MMOL/L — LOW (ref 3.5–5.3)
POTASSIUM SERPL-SCNC: 3.2 MMOL/L — LOW (ref 3.5–5.3)
PROT SERPL-MCNC: 6.8 G/DL — SIGNIFICANT CHANGE UP (ref 6–8.3)
RBC # BLD: 3.46 M/UL — LOW (ref 3.8–5.2)
RBC # FLD: 13.9 % — SIGNIFICANT CHANGE UP (ref 10.3–14.5)
SODIUM SERPL-SCNC: 143 MMOL/L — SIGNIFICANT CHANGE UP (ref 135–145)
SPECIMEN SOURCE: SIGNIFICANT CHANGE UP
SPECIMEN SOURCE: SIGNIFICANT CHANGE UP
WBC # BLD: 18.04 K/UL — HIGH (ref 3.8–10.5)
WBC # FLD AUTO: 18.04 K/UL — HIGH (ref 3.8–10.5)

## 2019-10-04 PROCEDURE — 99233 SBSQ HOSP IP/OBS HIGH 50: CPT | Mod: GC

## 2019-10-04 RX ORDER — POTASSIUM CHLORIDE 20 MEQ
40 PACKET (EA) ORAL EVERY 4 HOURS
Refills: 0 | Status: COMPLETED | OUTPATIENT
Start: 2019-10-04 | End: 2019-10-04

## 2019-10-04 RX ORDER — LACTOBACILLUS ACIDOPHILUS 100MM CELL
1 CAPSULE ORAL
Refills: 0 | Status: DISCONTINUED | OUTPATIENT
Start: 2019-10-04 | End: 2019-10-06

## 2019-10-04 RX ORDER — CIPROFLOXACIN LACTATE 400MG/40ML
400 VIAL (ML) INTRAVENOUS EVERY 12 HOURS
Refills: 0 | Status: DISCONTINUED | OUTPATIENT
Start: 2019-10-04 | End: 2019-10-06

## 2019-10-04 RX ADMIN — Medication 200 MILLIGRAM(S): at 21:17

## 2019-10-04 RX ADMIN — SODIUM CHLORIDE 75 MILLILITER(S): 9 INJECTION, SOLUTION INTRAVENOUS at 22:40

## 2019-10-04 RX ADMIN — Medication 40 MILLIEQUIVALENT(S): at 13:20

## 2019-10-04 RX ADMIN — Medication 200 MILLIGRAM(S): at 13:21

## 2019-10-04 RX ADMIN — Medication 40 MILLIEQUIVALENT(S): at 10:11

## 2019-10-04 RX ADMIN — MEROPENEM 100 MILLIGRAM(S): 1 INJECTION INTRAVENOUS at 13:20

## 2019-10-04 RX ADMIN — Medication 200 MILLIGRAM(S): at 06:19

## 2019-10-04 RX ADMIN — PANTOPRAZOLE SODIUM 40 MILLIGRAM(S): 20 TABLET, DELAYED RELEASE ORAL at 06:19

## 2019-10-04 RX ADMIN — MEROPENEM 100 MILLIGRAM(S): 1 INJECTION INTRAVENOUS at 06:19

## 2019-10-04 RX ADMIN — Medication 200 MILLIGRAM(S): at 20:38

## 2019-10-04 RX ADMIN — SODIUM CHLORIDE 75 MILLILITER(S): 9 INJECTION, SOLUTION INTRAVENOUS at 06:19

## 2019-10-04 NOTE — PROGRESS NOTE ADULT - ATTENDING COMMENTS
I personally conducted a physical examination of the patient. I personally gathered the patient's history. I edited the above listed findings which were prepared by the listed resident physician. I personally discussed the plan of care with the patient. The questions and concerns were addressed to the best of my ability. The patient is in agreement with the listed treatment plan.     - blood cultures cleared. sensitivities reviewed. likely d/c home on po cipro over the weekend. pt/family aware and agreeable.  -  utilized to discuss w/ pt and family

## 2019-10-04 NOTE — PROGRESS NOTE ADULT - SUBJECTIVE AND OBJECTIVE BOX
ID Progress note     Name: MACY SANDERS  Age: 74y  Gender: Female  MRN: [id number]    Interval History-- Events noted, feels good, grand daughter at bedside, she complaints of GERD/ heart burn . Afebrile . No plans to return back to [facility] as per family   Notes reviewed    Past Medical History--  Gastroesophageal reflux disease, esophagitis presence not specified  Hypertension  No pertinent past medical history  No significant past surgical history  No significant past surgical history      For details regarding the patient's social history, family history, and other miscellaneous elements, please refer the initial infectious diseases consultation and/or the admitting history and physical examination for this admission.    Allergies--  Allergies    No Known Allergies    Intolerances        Medications--  Antibiotics:  meropenem  IVPB 1000 milliGRAM(s) IV Intermittent every 8 hours  meropenem  IVPB        Immunologic:    Other:  acetaminophen   Tablet .. PRN  acetaminophen   Tablet .. PRN  lactated ringers  pantoprazole    Tablet  phenazopyridine  traMADol PRN      Review of Systems--  Review of systems unable to be obtained secondary to clinical condition.    Physical Examination--    Vital Signs: T(F): 98.6 (10-04-19 @ 16:01), Max: 99.2 (10-03-19 @ 20:14)  HR: 58 (10-04-19 @ 16:01)  BP: 146/77 (10-04-19 @ 16:01)  RR: 18 (10-04-19 @ 16:01)  SpO2: 96% (10-04-19 @ 16:01)  Wt(kg): --  General: Nontoxic-appearing Female in no acute distress.  HEENT: AT/NC. PERRL. EOMI. Anicteric. Conjunctiva pink and moist. Oropharynx clear. Dentition fair.  Neck: Not rigid. No sense of mass.  Nodes: None palpable.  Lungs: Clear bilaterally without rales, wheezing or rhonchi  Heart: Regular rate and rhythm. No Murmur. No rub. No gallop. No palpable thrill.  Abdomen: Bowel sounds present and normoactive. Soft. Nondistended. Nontender. No sense of mass. No organomegaly.  Back: No spinal tenderness. No costovertebral angle tenderness.   Extremities: No cyanosis or clubbing. No edema.   Skin: Warm. Dry. Good turgor. No rash. No vasculitic stigmata.  Psychiatric: Appropriate affect and mood for situation.         Laboratory Studies--  CBC                        10.8   18.04 )-----------( 101      ( 04 Oct 2019 06:44 )             31.9       Chemistries  10-04    143  |  110<H>  |  7   ----------------------------<  117<H>  3.2<L>   |  26  |  0.60    Ca    8.3<L>      04 Oct 2019 06:44  Phos  2.5     10-04  Mg     1.9     10-04    TPro  6.8  /  Alb  2.6<L>  /  TBili  0.6  /  DBili  .10  /  AST  24  /  ALT  28  /  AlkPhos  65  10-04        Recent Cultures:    Culture - Blood (collected 03 Oct 2019 11:15)  Source: .Blood Blood-Peripheral  Preliminary Report (04 Oct 2019 12:01):    No growth to date.    Culture - Blood (collected 03 Oct 2019 11:15)  Source: .Blood Blood-Peripheral  Preliminary Report (04 Oct 2019 12:01):    No growth to date.    Culture - Blood (collected 03 Oct 2019 09:03)  Source: .Blood Blood-Peripheral  Preliminary Report (04 Oct 2019 10:01):    No growth to date.    Culture - Blood (collected 03 Oct 2019 09:03)  Source: .Blood Blood-Peripheral  Preliminary Report (04 Oct 2019 10:01):    No growth to date.    Culture - Urine (collected 02 Oct 2019 09:12)  Source: .Urine Clean Catch (Midstream)  Final Report (03 Oct 2019 05:12):    No growth    Culture - Blood (10.02.19 @ 09:02)    -  Enterobacter cloacae complex: Detec    Gram Stain:   Growth in anaerobic bottle: Gram Negative Rods  Growth in aerobic bottle: Gram Negative Rods    -  Amikacin: S <=16    -  Ampicillin: R <=8 These ampicillin results predict results for amoxicillin    -  Ampicillin/Sulbactam: R <=4/2 Enterobacter, Citrobacter, and Serratia may develop resistance during prolonged therapy (3-4 days)    -  Aztreonam: S 8    -  Cefazolin: R >16 Enterobacter, Citrobacter, and Serratia may develop resistance during prolonged therapy (3-4 days)    -  Cefepime: S <=2    -  Cefoxitin: R >16    -  Ceftriaxone: S <=1 Enterobacter, Citrobacter, and Serratia may develop resistance during prolonged therapy    -  Ciprofloxacin: S <=1    -  Ertapenem: S <=0.5    -  Gentamicin: S <=2    -  Imipenem: I 2    -  Levofloxacin: S <=2    -  Meropenem: S <=1    -  Piperacillin/Tazobactam: S <=8    -  Tigecycline: S <=1    -  Tobramycin: S <=2    -  Trimethoprim/Sulfamethoxazole: S <=2/38    Specimen Source: .Blood Blood-Peripheral    Organism: Blood Culture PCR    Organism: Enterobacter cloacae complex    Culture Results:   Growth in aerobic and anaerobic bottles: Enterobacter cloacae complex  "Due to technical problems, Proteus sp. will Not be reported as part of  the BCID panel until further notice"  ***Blood Panel PCR results on this specimen are available  approximately 3 hours after the Gram stain result.***  Gram stain, PCR, and/or culture results may not always  correspond due to difference in methodologies.  ************************************************************  This PCR assay was performed using [employer].  The following targets are tested for: Enterococcus,  vancomycin resistant enterococci, Listeria monocytogenes,  coagulase negative staphylococci, S. aureus,  methicillin resistant S. aureus, Streptococcus agalactiae  (Group B), S. pneumoniae, S. pyogenes (Group A),  Acinetobacter baumannii, Enterobacter cloacae, E. coli,  Klebsiella oxytoca, K. pneumoniae, Proteus sp.,  Serratia marcescens, Haemophilus influenzae,  Neisseria meningitidis, Pseudomonas aeruginosa, Candida  albicans, C. glabrata, C krusei, C parapsilosis,  C. tropicalis and the KPC resistance gene.    Organism Identification: Blood Culture PCR  Enterobacter cloacae complex    Method Type: PCR    Method Type: ALMITA      Culture - Blood (collected 02 Oct 2019 09:02)  Source: .Blood Blood-Peripheral  Gram Stain (03 Oct 2019 04:01):    Growth in anaerobic bottle: Gram Negative Rods    Growth in aerobic bottle: Gram Negative Rods  Final Report (04 Oct 2019 15:48):    Growth in aerobic and anaerobic bottles: Enterobacter cloacae complex    See previous culture [id number]        Radiology:  Xray Kidney Ureter Bladder (10.03.19 @ 08:07) >  Left double-J ureteral stent noted in situ. Left upper quadrant   calcification adjacent to the stent consistent withthe largest left UPJ   calculus noted on recent CT. No other abnormal calcareous opacities.   Unremarkable bowel gas pattern. Degenerative change and dextroscoliosis   lumbar spine.    Impression: Status post left ureteral stent. Calcific left UPJ calculus     Xray Chest 1 View-PORTABLE IMMEDIATE (10.02.19 @ 05:01) >  Findings:  Lines: None    Heart/Mediastinum/Lungs: The heart size is enlarged.The lungs are   clear.There are no pleural effusions.    Impression:    Clear lungs.    CT Abdomen and Pelvis w/ IV Cont (10.02.19 @ 03:53) >  FINDINGS:    LOWER CHEST: Within normal limits.    HEPATOBILIARY: Within normal limits.  PANCREAS: Within normal limits.  SPLEEN: Within normal limits.  ADRENALS: Within normal limits.    KIDNEYS/URETERS/BLADDER: 1 cm left ureteropelvic junction calculus   causing moderate hydronephrosis, delayed nephrogram and mild perinephric   fat stranding with trace pararenal and left paracolic gutter ascites.   Right kidney, ureter and bladder are within normal limits.  REPRODUCTIVE ORGANS: Within normal limits.    BOWEL/PERITONEUM: No bowel obstruction or pneumoperitoneum.  Appendix not   clearly visualized, however no pericecal inflammation. Portions of the   gastrointestinal tract are collapsed, limiting evaluation.     VESSELS:  Within normal limits.  LYMPHATICS/RETROPERITONEUM: No lymphadenopathy. No retroperitoneal   hematoma.      SOFT TISSUES: Within normal limits.  BONES: Multilevel spinal degenerative changes.    IMPRESSION: Left obstructive uropathy secondary to 1 cm left   ureteropelvic junction calculus. Urology follow-up recommended.        Assessment :     74 year old admitted with left renal colic with severe gram sepsis based on criteria secondary to obstructive uropathy , she is s/p emergent Cysto and stent placement . She has a 1 cm Left UPJ stone, may have pyelonephritis .Post op has bandemia and leukocytosis likely related to sepsis. Blood cs identifies as Enterobacter sensitive to Cipro     Plan :   - will change to IV Cipro 400 mg q 12 , can be changed to po 500 mg q12 to complete total 10 days of abx provided repeat blood cs negative   - repeat blood cs NGTD  - trend cbc   - continue with hydration   - follow with Urology as may need ESWL for ureteral stone       Continue with present regime .  Appropriate use of antibiotics and adverse effects reviewed.    I have discussed the above plan of care with patient and family in detail. They expressed understanding of the treatment plan . Risks, benefits and alternatives discussed in detail. I have asked if they have any questions or concerns and appropriately addressed them to the best of my ability .      > 35 minutes spent in direct patient care reviewing  the notes, lab data/ imaging , discussion with multidisciplinary team. All questions were addressed and answered to the best of my capacity .    Thank you for allowing me to participate in the care of your patient .        Karen Solares MD  [phone]

## 2019-10-04 NOTE — PROGRESS NOTE ADULT - SUBJECTIVE AND OBJECTIVE BOX
Patient is a 74y old  Female who presents with a chief complaint of L obstructive uropathy 2/2 L ureteropelvic junction calculus (03 Oct 2019 16:36)      FROM ADMISSION H+P:   HPI:  73 yo F with reported hx of "colon problem" (on esomeprazole) and HTN (on olmesartan) presents with fever and LLQ abdominal pain. Patient is here on vacation from [facility]. She has been having some on/off pain for 8 days now, thought to be related to her "colon bacteria problem." On 10/1 at 7PM she started having severe 9/10 left flank pain traveling to her back. Pain comes and goes. She admits to subjective fever and chills while at [employer]. She denies any cardiac history aside from hypertension. Currently her pain is controlled with medication 4/10 at present. She admits chills, headache, constipation and nausea. She denies chest pain, palpitations, visual changes, dyspnea, orthopnea, PND, dysuria or hematuria.    In the ED, Vital Signs: T(F): 99.9 - 102.5, HR: 83 - 98, BP: 100/66 - 145/79, RR: 16, SpO2: 96% on RA. Labs showed leukocytosis WBC 15.46 with L shift, lactate 3.2 -> 4.4, BUN/Cr 13/1. U/A showed trace LE, small blood, otherwise negative. CT A/P with IV contrast showed Left obstructive uropathy secondary to 1 cm left ureteropelvic junction calculus. CXR no acute disease. EKG was NSR at 87 bpm with LAD ?anterior infarct with no acute ischemic changes. Patient received NS boluses (total of 3.5 L), IV Zosyn x1, Tylenol x1, Ibuprofen x1. Labs, imaging and EKG all personally reviewed, (02 Oct 2019 05:24)      ----  INTERVAL HPI/OVERNIGHT EVENTS: Pt seen and evaluated at the bedside. No acute overnight events occurred. Patient feels well today, asking when she can go home. Denies fever, chills, n/v/d/c. Denies urinary complaints.    ----  PAST MEDICAL & SURGICAL HISTORY:  Gastroesophageal reflux disease, esophagitis presence not specified  Hypertension  No significant past surgical history      FAMILY HISTORY:  FH: type 2 diabetes: daughter      ----  MEDICATIONS  (STANDING):  lactated ringers 1000 milliLiter(s) (75 mL/Hr) IV Continuous <Continuous>  meropenem  IVPB 1000 milliGRAM(s) IV Intermittent every 8 hours  meropenem  IVPB      pantoprazole    Tablet 40 milliGRAM(s) Oral before breakfast  phenazopyridine 200 milliGRAM(s) Oral three times a day  potassium chloride    Tablet ER 40 milliEquivalent(s) Oral every 4 hours    MEDICATIONS  (PRN):  acetaminophen   Tablet .. 650 milliGRAM(s) Oral every 6 hours PRN Temp greater or equal to 38C (100.4F), Mild Pain (1 - 3)  acetaminophen   Tablet .. 1000 milliGRAM(s) Oral every 6 hours PRN Moderate Pain (4 - 6)  traMADol 25 milliGRAM(s) Oral three times a day PRN Severe Pain (7 - 10)      ----  REVIEW OF SYSTEMS:  CONSTITUTIONAL: denies fever, chills, fatigue, weakness  HEENT: denies blurred vision  CARDIOVASCULAR: denies chest pain, chest pressure, palpitations  RESPIRATORY: denies shortness of breath, sputum production  GASTROINTESTINAL: denies nausea, vomiting, diarrhea, abdominal pain  GENITOURINARY: denies dysuria, discharge  NEUROLOGICAL: denies numbness, headache, focal weakness  MUSCULOSKELETAL: denies new joint pain, muscle aches      ----  PHYSICAL EXAM:  GENERAL: patient appears well, no acute distress, appropriate, pleasant  EYES: sclera clear, no exudates  ENMT: oropharynx clear without erythema, no exudates, moist mucous membranes  LUNGS: good air entry bilaterally, clear to auscultation, symmetric breath sounds, no wheezing or rhonchi appreciated  HEART: soft S1/S2, regular rate and rhythm, no murmurs noted, no lower extremity edema  GASTROINTESTINAL: abdomen is soft, nontender, nondistended, normoactive bowel sounds, no palpable masses  INTEGUMENT: good skin turgor, no lesions noted  MUSCULOSKELETAL: no clubbing or cyanosis, no obvious deformity  NEUROLOGIC: awake, alert, oriented x3, good muscle tone in 4 extremities, no obvious sensory deficits    T(C): 37.3 (10-04-19 @ 07:17), Max: 37.8 (10-03-19 @ 16:49)  HR: 59 (10-04-19 @ 07:17) (59 - 67)  BP: 143/82 (10-04-19 @ 07:17) (133/86 - 152/82)  RR: 17 (10-04-19 @ 07:17) (16 - 17)  SpO2: 95% (10-04-19 @ 07:17) (95% - 97%)  Wt(kg): --    ----  I&O's Summary    03 Oct 2019 07:01  -  04 Oct 2019 07:00  --------------------------------------------------------  IN: 3650 mL / OUT: 5300 mL / NET: -1650 mL    04 Oct 2019 07:01  -  04 Oct 2019 13:06  --------------------------------------------------------  IN: 0 mL / OUT: 700 mL / NET: -700 mL        LABS:                        10.8   18.04 )-----------( 101      ( 04 Oct 2019 06:44 )             31.9     10-04    143  |  110<H>  |  7   ----------------------------<  117<H>  3.2<L>   |  26  |  0.60    Ca    8.3<L>      04 Oct 2019 06:44  Phos  2.5     10-04  Mg     1.9     10-04    TPro  6.8  /  Alb  2.6<L>  /  TBili  0.6  /  DBili  .10  /  AST  24  /  ALT  28  /  AlkPhos  65  10-04        CAPILLARY BLOOD GLUCOSE          10-03 @ 11:15   No growth to date.  --  --  10-03 @ 09:03   No growth to date.  --  --  10-02 @ 09:12   No growth  --  --  10-02 @ 09:02   Growth in anaerobic bottle: Enterobacter cloacae complex  See previous culture [id number]  Growth in aerobic bottle: Gram Negative Rods  --  Blood Culture PCR            ----  Personally reviewed:  Vital sign trends: [  ] yes    [  ] no     [  ] n/a  Laboratory results: [  ] yes    [  ] no     [  ] n/a  Radiology results: [  ] yes    [  ] no     [  ] n/a  Culture results: [  ] yes    [  ] no     [  ] n/a  Consultant recommendations: [  ] yes    [  ] no     [  ] n/a Patient is a 74y old  Female who presents with a chief complaint of L obstructive uropathy 2/2 L ureteropelvic junction calculus (03 Oct 2019 16:36)      FROM ADMISSION H+P:   HPI:  75 yo F with reported hx of "colon problem" (on esomeprazole) and HTN (on olmesartan) presents with fever and LLQ abdominal pain. Patient is here on vacation from [facility]. She has been having some on/off pain for 8 days now, thought to be related to her "colon bacteria problem." On 10/1 at 7PM she started having severe 9/10 left flank pain traveling to her back. Pain comes and goes. She admits to subjective fever and chills while at [employer]. She denies any cardiac history aside from hypertension. Currently her pain is controlled with medication 4/10 at present. She admits chills, headache, constipation and nausea. She denies chest pain, palpitations, visual changes, dyspnea, orthopnea, PND, dysuria or hematuria.    In the ED, Vital Signs: T(F): 99.9 - 102.5, HR: 83 - 98, BP: 100/66 - 145/79, RR: 16, SpO2: 96% on RA. Labs showed leukocytosis WBC 15.46 with L shift, lactate 3.2 -> 4.4, BUN/Cr 13/1. U/A showed trace LE, small blood, otherwise negative. CT A/P with IV contrast showed Left obstructive uropathy secondary to 1 cm left ureteropelvic junction calculus. CXR no acute disease. EKG was NSR at 87 bpm with LAD ?anterior infarct with no acute ischemic changes. Patient received NS boluses (total of 3.5 L), IV Zosyn x1, Tylenol x1, Ibuprofen x1. Labs, imaging and EKG all personally reviewed, (02 Oct 2019 05:24)      ----  INTERVAL HPI/OVERNIGHT EVENTS: Pt seen and evaluated at the bedside.  phone used during our encounter. ID # __________________.  No acute overnight events occurred. Patient feels well today, asking when she can go home. Denies fever, chills, n/v/d/c. Denies urinary complaints.    ----  PAST MEDICAL & SURGICAL HISTORY:  Gastroesophageal reflux disease, esophagitis presence not specified  Hypertension  No significant past surgical history      FAMILY HISTORY:  FH: type 2 diabetes: daughter      ----  MEDICATIONS  (STANDING):  lactated ringers 1000 milliLiter(s) (75 mL/Hr) IV Continuous <Continuous>  meropenem  IVPB 1000 milliGRAM(s) IV Intermittent every 8 hours  meropenem  IVPB      pantoprazole    Tablet 40 milliGRAM(s) Oral before breakfast  phenazopyridine 200 milliGRAM(s) Oral three times a day  potassium chloride    Tablet ER 40 milliEquivalent(s) Oral every 4 hours    MEDICATIONS  (PRN):  acetaminophen   Tablet .. 650 milliGRAM(s) Oral every 6 hours PRN Temp greater or equal to 38C (100.4F), Mild Pain (1 - 3)  acetaminophen   Tablet .. 1000 milliGRAM(s) Oral every 6 hours PRN Moderate Pain (4 - 6)  traMADol 25 milliGRAM(s) Oral three times a day PRN Severe Pain (7 - 10)      ----  REVIEW OF SYSTEMS:  CONSTITUTIONAL: denies fever, chills, fatigue, weakness  HEENT: denies blurred vision  CARDIOVASCULAR: denies chest pain, chest pressure, palpitations  RESPIRATORY: denies shortness of breath, sputum production  GASTROINTESTINAL: denies nausea, vomiting, diarrhea, abdominal pain  GENITOURINARY: denies dysuria, discharge  NEUROLOGICAL: denies numbness, headache, focal weakness  MUSCULOSKELETAL: denies new joint pain, muscle aches      ----  PHYSICAL EXAM:  GENERAL: patient appears well, no acute distress, appropriate, pleasant  EYES: sclera clear, no exudates  ENMT: oropharynx clear without erythema, no exudates, moist mucous membranes  LUNGS: good air entry bilaterally, clear to auscultation, symmetric breath sounds, no wheezing or rhonchi appreciated  HEART: soft S1/S2, regular rate and rhythm, no murmurs noted, no lower extremity edema  GASTROINTESTINAL: abdomen is soft, nontender, nondistended, normoactive bowel sounds, no palpable masses  INTEGUMENT: good skin turgor, no lesions noted  MUSCULOSKELETAL: no clubbing or cyanosis, no obvious deformity  NEUROLOGIC: awake, alert, oriented x3, good muscle tone in 4 extremities, no obvious sensory deficits    T(C): 37.3 (10-04-19 @ 07:17), Max: 37.8 (10-03-19 @ 16:49)  HR: 59 (10-04-19 @ 07:17) (59 - 67)  BP: 143/82 (10-04-19 @ 07:17) (133/86 - 152/82)  RR: 17 (10-04-19 @ 07:17) (16 - 17)  SpO2: 95% (10-04-19 @ 07:17) (95% - 97%)  Wt(kg): --    ----  I&O's Summary    03 Oct 2019 07:01  -  04 Oct 2019 07:00  --------------------------------------------------------  IN: 3650 mL / OUT: 5300 mL / NET: -1650 mL    04 Oct 2019 07:01  -  04 Oct 2019 13:06  --------------------------------------------------------  IN: 0 mL / OUT: 700 mL / NET: -700 mL        LABS:                        10.8   18.04 )-----------( 101      ( 04 Oct 2019 06:44 )             31.9     10-04    143  |  110<H>  |  7   ----------------------------<  117<H>  3.2<L>   |  26  |  0.60    Ca    8.3<L>      04 Oct 2019 06:44  Phos  2.5     10-04  Mg     1.9     10-04    TPro  6.8  /  Alb  2.6<L>  /  TBili  0.6  /  DBili  .10  /  AST  24  /  ALT  28  /  AlkPhos  65  10-04        CAPILLARY BLOOD GLUCOSE          10-03 @ 11:15   No growth to date.  --  --  10-03 @ 09:03   No growth to date.  --  --  10-02 @ 09:12   No growth  --  --  10-02 @ 09:02   Growth in anaerobic bottle: Enterobacter cloacae complex  See previous culture [id number]  Growth in aerobic bottle: Gram Negative Rods  --  Blood Culture PCR            ----  Personally reviewed:  Vital sign trends: [  ] yes    [  ] no     [  ] n/a  Laboratory results: [  ] yes    [  ] no     [  ] n/a  Radiology results: [  ] yes    [  ] no     [  ] n/a  Culture results: [  ] yes    [  ] no     [  ] n/a  Consultant recommendations: [  ] yes    [  ] no     [  ] n/a Patient is a 74y old  Female who presents with a chief complaint of L obstructive uropathy 2/2 L ureteropelvic junction calculus (03 Oct 2019 16:36)      FROM ADMISSION H+P:   HPI:  73 yo F with reported hx of "colon problem" (on esomeprazole) and HTN (on olmesartan) presents with fever and LLQ abdominal pain. Patient is here on vacation from [facility]. She has been having some on/off pain for 8 days now, thought to be related to her "colon bacteria problem." On 10/1 at 7PM she started having severe 9/10 left flank pain traveling to her back. Pain comes and goes. She admits to subjective fever and chills while at [employer]. She denies any cardiac history aside from hypertension. Currently her pain is controlled with medication 4/10 at present. She admits chills, headache, constipation and nausea. She denies chest pain, palpitations, visual changes, dyspnea, orthopnea, PND, dysuria or hematuria.    In the ED, Vital Signs: T(F): 99.9 - 102.5, HR: 83 - 98, BP: 100/66 - 145/79, RR: 16, SpO2: 96% on RA. Labs showed leukocytosis WBC 15.46 with L shift, lactate 3.2 -> 4.4, BUN/Cr 13/1. U/A showed trace LE, small blood, otherwise negative. CT A/P with IV contrast showed Left obstructive uropathy secondary to 1 cm left ureteropelvic junction calculus. CXR no acute disease. EKG was NSR at 87 bpm with LAD ?anterior infarct with no acute ischemic changes. Patient received NS boluses (total of 3.5 L), IV Zosyn x1, Tylenol x1, Ibuprofen x1. Labs, imaging and EKG all personally reviewed, (02 Oct 2019 05:24)      ----  INTERVAL HPI/OVERNIGHT EVENTS: Pt seen and evaluated at the bedside.  phone used during our encounter. ID # [id number].  No acute overnight events occurred. Patient feels well today, asking when she can go home. Denies fever, chills, n/v/d/c. Denies urinary complaints.    ----  PAST MEDICAL & SURGICAL HISTORY:  Gastroesophageal reflux disease, esophagitis presence not specified  Hypertension  No significant past surgical history      FAMILY HISTORY:  FH: type 2 diabetes: daughter      ----  MEDICATIONS  (STANDING):  lactated ringers 1000 milliLiter(s) (75 mL/Hr) IV Continuous <Continuous>  meropenem  IVPB 1000 milliGRAM(s) IV Intermittent every 8 hours  meropenem  IVPB      pantoprazole    Tablet 40 milliGRAM(s) Oral before breakfast  phenazopyridine 200 milliGRAM(s) Oral three times a day  potassium chloride    Tablet ER 40 milliEquivalent(s) Oral every 4 hours    MEDICATIONS  (PRN):  acetaminophen   Tablet .. 650 milliGRAM(s) Oral every 6 hours PRN Temp greater or equal to 38C (100.4F), Mild Pain (1 - 3)  acetaminophen   Tablet .. 1000 milliGRAM(s) Oral every 6 hours PRN Moderate Pain (4 - 6)  traMADol 25 milliGRAM(s) Oral three times a day PRN Severe Pain (7 - 10)      ----  REVIEW OF SYSTEMS:  CONSTITUTIONAL: denies fever, chills, fatigue, weakness  HEENT: denies blurred vision  CARDIOVASCULAR: denies chest pain, chest pressure, palpitations  RESPIRATORY: denies shortness of breath, sputum production  GASTROINTESTINAL: denies nausea, vomiting, diarrhea, abdominal pain  GENITOURINARY: denies dysuria, discharge  NEUROLOGICAL: denies numbness, headache, focal weakness  MUSCULOSKELETAL: denies new joint pain, muscle aches      ----  PHYSICAL EXAM:  GENERAL: patient appears well, no acute distress, appropriate, pleasant  EYES: sclera clear, no exudates  ENMT: oropharynx clear without erythema, no exudates, moist mucous membranes  LUNGS: good air entry bilaterally, clear to auscultation, symmetric breath sounds, no wheezing or rhonchi appreciated  HEART: soft S1/S2, regular rate and rhythm, no murmurs noted, no lower extremity edema  GASTROINTESTINAL: abdomen is soft, nontender, nondistended, normoactive bowel sounds, no palpable masses  INTEGUMENT: good skin turgor, no lesions noted  MUSCULOSKELETAL: no clubbing or cyanosis, no obvious deformity  NEUROLOGIC: awake, alert, oriented x3, good muscle tone in 4 extremities, no obvious sensory deficits    T(C): 37.3 (10-04-19 @ 07:17), Max: 37.8 (10-03-19 @ 16:49)  HR: 59 (10-04-19 @ 07:17) (59 - 67)  BP: 143/82 (10-04-19 @ 07:17) (133/86 - 152/82)  RR: 17 (10-04-19 @ 07:17) (16 - 17)  SpO2: 95% (10-04-19 @ 07:17) (95% - 97%)  Wt(kg): --    ----  I&O's Summary    03 Oct 2019 07:01  -  04 Oct 2019 07:00  --------------------------------------------------------  IN: 3650 mL / OUT: 5300 mL / NET: -1650 mL    04 Oct 2019 07:01  -  04 Oct 2019 13:06  --------------------------------------------------------  IN: 0 mL / OUT: 700 mL / NET: -700 mL        LABS:                        10.8   18.04 )-----------( 101      ( 04 Oct 2019 06:44 )             31.9     10-04    143  |  110<H>  |  7   ----------------------------<  117<H>  3.2<L>   |  26  |  0.60    Ca    8.3<L>      04 Oct 2019 06:44  Phos  2.5     10-04  Mg     1.9     10-04    TPro  6.8  /  Alb  2.6<L>  /  TBili  0.6  /  DBili  .10  /  AST  24  /  ALT  28  /  AlkPhos  65  10-04        CAPILLARY BLOOD GLUCOSE          10-03 @ 11:15   No growth to date.  --  --  10-03 @ 09:03   No growth to date.  --  --  10-02 @ 09:12   No growth  --  --  10-02 @ 09:02   Growth in anaerobic bottle: Enterobacter cloacae complex  See previous culture [id number]  Growth in aerobic bottle: Gram Negative Rods  --  Blood Culture PCR            ----  Personally reviewed:  Vital sign trends: [  ] yes    [  ] no     [  ] n/a  Laboratory results: [  ] yes    [  ] no     [  ] n/a  Radiology results: [  ] yes    [  ] no     [  ] n/a  Culture results: [  ] yes    [  ] no     [  ] n/a  Consultant recommendations: [  ] yes    [  ] no     [  ] n/a Patient is a 74y old  Female who presents with a chief complaint of L obstructive uropathy 2/2 L ureteropelvic junction calculus (03 Oct 2019 16:36)      FROM ADMISSION H+P:   HPI:  75 yo F with reported hx of "colon problem" (on esomeprazole) and HTN (on olmesartan) presents with fever and LLQ abdominal pain. Patient is here on vacation from [facility]. She has been having some on/off pain for 8 days now, thought to be related to her "colon bacteria problem." On 10/1 at 7PM she started having severe 9/10 left flank pain traveling to her back. Pain comes and goes. She admits to subjective fever and chills while at [employer]. She denies any cardiac history aside from hypertension. Currently her pain is controlled with medication 4/10 at present. She admits chills, headache, constipation and nausea. She denies chest pain, palpitations, visual changes, dyspnea, orthopnea, PND, dysuria or hematuria.    In the ED, Vital Signs: T(F): 99.9 - 102.5, HR: 83 - 98, BP: 100/66 - 145/79, RR: 16, SpO2: 96% on RA. Labs showed leukocytosis WBC 15.46 with L shift, lactate 3.2 -> 4.4, BUN/Cr 13/1. U/A showed trace LE, small blood, otherwise negative. CT A/P with IV contrast showed Left obstructive uropathy secondary to 1 cm left ureteropelvic junction calculus. CXR no acute disease. EKG was NSR at 87 bpm with LAD ?anterior infarct with no acute ischemic changes. Patient received NS boluses (total of 3.5 L), IV Zosyn x1, Tylenol x1, Ibuprofen x1. Labs, imaging and EKG all personally reviewed, (02 Oct 2019 05:24)      ----  INTERVAL HPI/OVERNIGHT EVENTS: Pt seen and evaluated at the bedside.  phone used during our encounter. ID # [id number].  No acute overnight events occurred. Patient feels well today, asking when she can go home. Denies fever, chills, n/v/d/c. Denies urinary complaints.    ----  PAST MEDICAL & SURGICAL HISTORY:  Gastroesophageal reflux disease, esophagitis presence not specified  Hypertension  No significant past surgical history      FAMILY HISTORY:  FH: type 2 diabetes: daughter      ----  MEDICATIONS  (STANDING):  lactated ringers 1000 milliLiter(s) (75 mL/Hr) IV Continuous <Continuous>  meropenem  IVPB 1000 milliGRAM(s) IV Intermittent every 8 hours  meropenem  IVPB      pantoprazole    Tablet 40 milliGRAM(s) Oral before breakfast  phenazopyridine 200 milliGRAM(s) Oral three times a day  potassium chloride    Tablet ER 40 milliEquivalent(s) Oral every 4 hours    MEDICATIONS  (PRN):  acetaminophen   Tablet .. 650 milliGRAM(s) Oral every 6 hours PRN Temp greater or equal to 38C (100.4F), Mild Pain (1 - 3)  acetaminophen   Tablet .. 1000 milliGRAM(s) Oral every 6 hours PRN Moderate Pain (4 - 6)  traMADol 25 milliGRAM(s) Oral three times a day PRN Severe Pain (7 - 10)      ----  REVIEW OF SYSTEMS:  CONSTITUTIONAL: denies fever, chills, fatigue, weakness  HEENT: denies blurred vision  CARDIOVASCULAR: denies chest pain, chest pressure, palpitations  RESPIRATORY: denies shortness of breath, sputum production  GASTROINTESTINAL: denies nausea, vomiting, diarrhea, abdominal pain  GENITOURINARY: denies dysuria, discharge  NEUROLOGICAL: denies numbness, headache, focal weakness  MUSCULOSKELETAL: denies new joint pain, muscle aches      ----  PHYSICAL EXAM:  GENERAL: patient appears well, no acute distress, appropriate, pleasant  EYES: sclera clear, no exudates  ENMT: oropharynx clear without erythema, no exudates, moist mucous membranes  LUNGS: good air entry bilaterally, clear to auscultation, symmetric breath sounds, no wheezing or rhonchi appreciated  HEART: soft S1/S2, regular rate and rhythm, no murmurs noted, no lower extremity edema  GASTROINTESTINAL: abdomen is soft, nontender, nondistended, normoactive bowel sounds, no palpable masses  INTEGUMENT: good skin turgor, no lesions noted  MUSCULOSKELETAL: no clubbing or cyanosis, no obvious deformity  NEUROLOGIC: awake, alert, oriented x3, good muscle tone in 4 extremities, no obvious sensory deficits    T(C): 37.3 (10-04-19 @ 07:17), Max: 37.8 (10-03-19 @ 16:49)  HR: 59 (10-04-19 @ 07:17) (59 - 67)  BP: 143/82 (10-04-19 @ 07:17) (133/86 - 152/82)  RR: 17 (10-04-19 @ 07:17) (16 - 17)  SpO2: 95% (10-04-19 @ 07:17) (95% - 97%)  Wt(kg): --    ----  I&O's Summary    03 Oct 2019 07:01  -  04 Oct 2019 07:00  --------------------------------------------------------  IN: 3650 mL / OUT: 5300 mL / NET: -1650 mL    04 Oct 2019 07:01  -  04 Oct 2019 13:06  --------------------------------------------------------  IN: 0 mL / OUT: 700 mL / NET: -700 mL        LABS:                        10.8   18.04 )-----------( 101      ( 04 Oct 2019 06:44 )             31.9     10-04    143  |  110<H>  |  7   ----------------------------<  117<H>  3.2<L>   |  26  |  0.60    Ca    8.3<L>      04 Oct 2019 06:44  Phos  2.5     10-04  Mg     1.9     10-04    TPro  6.8  /  Alb  2.6<L>  /  TBili  0.6  /  DBili  .10  /  AST  24  /  ALT  28  /  AlkPhos  65  10-04        CAPILLARY BLOOD GLUCOSE          10-03 @ 11:15   No growth to date.  --  --  10-03 @ 09:03   No growth to date.  --  --  10-02 @ 09:12   No growth  --  --  10-02 @ 09:02   Growth in anaerobic bottle: Enterobacter cloacae complex  See previous culture [id number]  Growth in aerobic bottle: Gram Negative Rods  --  Blood Culture PCR

## 2019-10-05 LAB
ANION GAP SERPL CALC-SCNC: 7 MMOL/L — SIGNIFICANT CHANGE UP (ref 5–17)
BASOPHILS # BLD AUTO: 0.05 K/UL — SIGNIFICANT CHANGE UP (ref 0–0.2)
BASOPHILS NFR BLD AUTO: 0.4 % — SIGNIFICANT CHANGE UP (ref 0–2)
BUN SERPL-MCNC: 6 MG/DL — LOW (ref 7–23)
CALCIUM SERPL-MCNC: 9 MG/DL — SIGNIFICANT CHANGE UP (ref 8.5–10.1)
CHLORIDE SERPL-SCNC: 104 MMOL/L — SIGNIFICANT CHANGE UP (ref 96–108)
CO2 SERPL-SCNC: 28 MMOL/L — SIGNIFICANT CHANGE UP (ref 22–31)
CREAT SERPL-MCNC: 0.69 MG/DL — SIGNIFICANT CHANGE UP (ref 0.5–1.3)
EOSINOPHIL # BLD AUTO: 0.33 K/UL — SIGNIFICANT CHANGE UP (ref 0–0.5)
EOSINOPHIL NFR BLD AUTO: 2.7 % — SIGNIFICANT CHANGE UP (ref 0–6)
GLUCOSE SERPL-MCNC: 123 MG/DL — HIGH (ref 70–99)
HCT VFR BLD CALC: 36.4 % — SIGNIFICANT CHANGE UP (ref 34.5–45)
HGB BLD-MCNC: 12.2 G/DL — SIGNIFICANT CHANGE UP (ref 11.5–15.5)
IMM GRANULOCYTES NFR BLD AUTO: 0.6 % — SIGNIFICANT CHANGE UP (ref 0–1.5)
LYMPHOCYTES # BLD AUTO: 33.8 % — SIGNIFICANT CHANGE UP (ref 13–44)
LYMPHOCYTES # BLD AUTO: 4.16 K/UL — HIGH (ref 1–3.3)
MCHC RBC-ENTMCNC: 31.1 PG — SIGNIFICANT CHANGE UP (ref 27–34)
MCHC RBC-ENTMCNC: 33.5 GM/DL — SIGNIFICANT CHANGE UP (ref 32–36)
MCV RBC AUTO: 92.9 FL — SIGNIFICANT CHANGE UP (ref 80–100)
MONOCYTES # BLD AUTO: 0.76 K/UL — SIGNIFICANT CHANGE UP (ref 0–0.9)
MONOCYTES NFR BLD AUTO: 6.2 % — SIGNIFICANT CHANGE UP (ref 2–14)
NEUTROPHILS # BLD AUTO: 6.94 K/UL — SIGNIFICANT CHANGE UP (ref 1.8–7.4)
NEUTROPHILS NFR BLD AUTO: 56.3 % — SIGNIFICANT CHANGE UP (ref 43–77)
NRBC # BLD: 0 /100 WBCS — SIGNIFICANT CHANGE UP (ref 0–0)
PLATELET # BLD AUTO: 128 K/UL — LOW (ref 150–400)
POTASSIUM SERPL-MCNC: 4 MMOL/L — SIGNIFICANT CHANGE UP (ref 3.5–5.3)
POTASSIUM SERPL-SCNC: 4 MMOL/L — SIGNIFICANT CHANGE UP (ref 3.5–5.3)
RBC # BLD: 3.92 M/UL — SIGNIFICANT CHANGE UP (ref 3.8–5.2)
RBC # FLD: 13.5 % — SIGNIFICANT CHANGE UP (ref 10.3–14.5)
SODIUM SERPL-SCNC: 139 MMOL/L — SIGNIFICANT CHANGE UP (ref 135–145)
WBC # BLD: 12.32 K/UL — HIGH (ref 3.8–10.5)
WBC # FLD AUTO: 12.32 K/UL — HIGH (ref 3.8–10.5)

## 2019-10-05 PROCEDURE — 99233 SBSQ HOSP IP/OBS HIGH 50: CPT

## 2019-10-05 RX ADMIN — Medication 200 MILLIGRAM(S): at 05:15

## 2019-10-05 RX ADMIN — Medication 1 TABLET(S): at 09:04

## 2019-10-05 RX ADMIN — Medication 200 MILLIGRAM(S): at 21:54

## 2019-10-05 RX ADMIN — Medication 650 MILLIGRAM(S): at 09:13

## 2019-10-05 RX ADMIN — Medication 650 MILLIGRAM(S): at 10:13

## 2019-10-05 RX ADMIN — Medication 200 MILLIGRAM(S): at 09:04

## 2019-10-05 RX ADMIN — SODIUM CHLORIDE 75 MILLILITER(S): 9 INJECTION, SOLUTION INTRAVENOUS at 12:41

## 2019-10-05 RX ADMIN — PANTOPRAZOLE SODIUM 40 MILLIGRAM(S): 20 TABLET, DELAYED RELEASE ORAL at 05:15

## 2019-10-05 RX ADMIN — Medication 200 MILLIGRAM(S): at 14:39

## 2019-10-05 RX ADMIN — Medication 200 MILLIGRAM(S): at 20:16

## 2019-10-05 RX ADMIN — Medication 1 TABLET(S): at 16:56

## 2019-10-05 NOTE — PROGRESS NOTE ADULT - NSHPATTENDINGPLANDISCUSS_GEN_ALL_CORE
pt, RN, family @ [facility] - re: tx plan, disposition planning, above detailed plan
pt, SW, CM, RN, urology - re: tx plan, disposition planning, above detailed plan
pt, SW, MAG, RN, residency team, ID - re: tx plan, disposition planning, above detailed plan

## 2019-10-05 NOTE — PROGRESS NOTE ADULT - SUBJECTIVE AND OBJECTIVE BOX
Patient is a 74y old  Female who presents with a chief complaint of L obstructive uropathy 2/2 L ureteropelvic junction calculus (04 Oct 2019 17:26)      FROM ADMISSION H+P:   HPI:  73 yo F with reported hx of "colon problem" (on esomeprazole) and HTN (on olmesartan) presents with fever and LLQ abdominal pain. Patient is here on vacation from [facility]. She has been having some on/off pain for 8 days now, thought to be related to her "colon bacteria problem." On 10/1 at 7PM she started having severe 9/10 left flank pain traveling to her back. Pain comes and goes. She admits to subjective fever and chills while at [employer]. She denies any cardiac history aside from hypertension. Currently her pain is controlled with medication 4/10 at present. She admits chills, headache, constipation and nausea. She denies chest pain, palpitations, visual changes, dyspnea, orthopnea, PND, dysuria or hematuria.    In the ED, Vital Signs: T(F): 99.9 - 102.5, HR: 83 - 98, BP: 100/66 - 145/79, RR: 16, SpO2: 96% on RA. Labs showed leukocytosis WBC 15.46 with L shift, lactate 3.2 -> 4.4, BUN/Cr 13/1. U/A showed trace LE, small blood, otherwise negative. CT A/P with IV contrast showed Left obstructive uropathy secondary to 1 cm left ureteropelvic junction calculus. CXR no acute disease. EKG was NSR at 87 bpm with LAD ?anterior infarct with no acute ischemic changes. Patient received NS boluses (total of 3.5 L), IV Zosyn x1, Tylenol x1, Ibuprofen x1. Labs, imaging and EKG all personally reviewed, (02 Oct 2019 05:24)      ----  The pt's primary RN today speaks fluent Zimbabwean so he interpreted for our interaction today.   INTERVAL HPI/OVERNIGHT EVENTS: Pt seen and evaluated at the bedside. No acute overnight events occurred. Pt states she had mild bifrontal HA this morning but tylenol quickly resolved this. Pt reports minimal L lower quad abd discomfort but she is "sure the antibiotics are working" because this symptoms have dramatically improved in last 24hrs. No other complaints. Had normal BM today. Urinating well without dysuria. No other complaints at this time. Family @ bedside for our encounter.     ----  PAST MEDICAL & SURGICAL HISTORY:  Gastroesophageal reflux disease, esophagitis presence not specified  Hypertension  No significant past surgical history      FAMILY HISTORY:  FH: type 2 diabetes: daughter      Allergies    No Known Allergies    Intolerances        ----  REVIEW OF SYSTEMS:  CONSTITUTIONAL: denies fever, chills  HEENT: denies blurred vision  CARDIOVASCULAR: denies chest pain, chest pressure, palpitations  RESPIRATORY: denies shortness of breath, sputum production  GASTROINTESTINAL: denies nausea, vomiting, diarrhea, abdominal pain  GENITOURINARY: denies dysuria, discharge  NEUROLOGICAL: denies numbness, admits mild HA this AM as per HPI, denies focal weakness  MUSCULOSKELETAL: denies new joint pain, muscle aches. denies CVA tenderness b/l      ----  PHYSICAL EXAM:  GENERAL: patient appears well, no acute distress, appropriate, pleasant, sitting in chair @ bedside. I have seen her ambulating comfortably in hallway throughout the last 24hrs  EYES: sclera clear, no exudates  ENMT: oropharynx clear without erythema, no exudates, moist mucous membranes  LUNGS: good air entry bilaterally, clear to auscultation, symmetric breath sounds, no wheezing or rhonchi appreciated  HEART: soft S1/S2, regular rate and rhythm, no murmurs noted, no lower extremity edema  GASTROINTESTINAL: abdomen is soft, nontender, nondistended, normoactive bowel sounds, no palpable masses  BACK: no CVA tenderness b/l  INTEGUMENT: good skin turgor, no lesions noted  MUSCULOSKELETAL: no clubbing or cyanosis, no obvious deformity  NEUROLOGIC: awake, alert, oriented x3, good muscle tone in 4 extremities, no obvious sensory deficits    T(C): 36.7 (10-05-19 @ 15:44), Max: 37.7 (10-05-19 @ 07:52)  HR: 56 (10-05-19 @ 15:44) (56 - 63)  BP: 125/86 (10-05-19 @ 15:44) (125/86 - 158/91)  RR: 18 (10-05-19 @ 15:44) (17 - 19)  SpO2: 96% (10-05-19 @ 15:44) (96% - 98%)  Wt(kg): --    ----  I&O's Summary    04 Oct 2019 07:01  -  05 Oct 2019 07:00  --------------------------------------------------------  IN: 1000 mL / OUT: 3400 mL / NET: -2400 mL    05 Oct 2019 07:01  -  05 Oct 2019 17:03  --------------------------------------------------------  IN: 425 mL / OUT: 850 mL / NET: -425 mL        LABS:                        12.2   12.32 )-----------( 128      ( 05 Oct 2019 06:33 )             36.4     10-05    139  |  104  |  6<L>  ----------------------------<  123<H>  4.0   |  28  |  0.69    Ca    9.0      05 Oct 2019 06:33  Phos  2.5     10-04  Mg     1.9     10-04    TPro  6.8  /  Alb  2.6<L>  /  TBili  0.6  /  DBili  .10  /  AST  24  /  ALT  28  /  AlkPhos  65  10-04        CAPILLARY BLOOD GLUCOSE          10-03 @ 11:15   No growth to date.  --  --  10-03 @ 09:03   No growth to date.  --  --  10-02 @ 09:12   No growth  --  --  10-02 @ 09:02   Growth in aerobic and anaerobic bottles: Enterobacter cloacae complex  See previous culture [id number]  --  Blood Culture PCR

## 2019-10-05 NOTE — PROGRESS NOTE ADULT - PROBLEM SELECTOR PLAN 3
BP soft on presentation  on olmesartan 40mg daily at home  hold ARB at this time  will continue use of ACEI/ARB once nephrolithiasis and concern for obstructive uropathy resolves
BP soft on presentation  on olmesartan 40mg daily at home  hold ARB at this time  will continue use of ACEI/ARB once nephrolithiasis and concern for obstructive uropathy resolves
BP soft on presentation  on olmesartan 40mg daily at home  hold ARB at this time pre-operatively  will continue use of ACEI/ARB once nephrolithiasis and concern for obstructive uropathy resolves

## 2019-10-05 NOTE — PROGRESS NOTE ADULT - PROBLEM SELECTOR PLAN 1
s/p emergent stent 10/2/19
severe sepsis and bacteremia (GNR) secondary to obstructing stone and likely UTI  - continue meropenem  - POD 2 s/p L sided stent placement, urology is following  - lactate normalized s/p fluid resuscitation  - will continue to monitor renal indices close and avoid nephrotoxic meds  - tylenol for fever and pain  - tolerating diet  - f/u final sensitivities
severe sepsis and bacteremia (GNR) secondary to obstructing stone and likely UTI  - now on cipro, will transition to po tomorrow and d/c home if uncomplicated course and BCx remain ngtd  - POD 3 s/p L sided stent placement, urology is following  - lactate normalized s/p fluid resuscitation  - will continue to monitor renal indices close and avoid nephrotoxic meds  - tylenol for fever and pain  - tolerating diet
severe sepsis and bacteremia (GNR) secondary to obstructing stone and likely UTI  - s/p IV zosyn in ED and >30cc/kg NS boluses, was on rocephin, d/c'd and transitioned to meropenem  - POD1 s/p L sided stent placement, urology is following  - lactate normalized s/p fluid resuscitation  - will continue to monitor renal indices close and avoid nephrotoxic meds  - tylenol for fever and pain  - decreased IVF today. tolerating diet

## 2019-10-05 NOTE — PROGRESS NOTE ADULT - PROBLEM SELECTOR PLAN 2
L obstructive uropathy 2/2 L ureteropelvic junction calculus on CT A/P  - pain control PRN  - management as above
fever trending down, AM labs pending, on rocephin
L obstructive uropathy 2/2 L ureteropelvic junction calculus on CT A/P  - pain control PRN  - management as above
L obstructive uropathy 2/2 L ureteropelvic junction calculus on CT A/P  - pain control PRN  - management as above

## 2019-10-05 NOTE — PROGRESS NOTE ADULT - PROBLEM SELECTOR PLAN 4
states that she has a "colon bacterial problem"  on home esomeprazole  continue PPI

## 2019-10-05 NOTE — PROGRESS NOTE ADULT - ATTENDING COMMENTS
The admission plan was discussed in detail with the patient and family members at the bedside. Their questions and concerns were addressed to the best of my ability. They are in agreement with the plan as detailed above. They demonstrated adequate understanding of the counseling which I have provided.      provider note to miss flight home. see in rx writer in EMR.  d/c planning to home.  I described the tx plan in detail to the pt and family. will need reliable outpt f/u with urology.

## 2019-10-06 ENCOUNTER — TRANSCRIPTION ENCOUNTER (OUTPATIENT)
Age: 74
End: 2019-10-06

## 2019-10-06 VITALS
OXYGEN SATURATION: 95 % | RESPIRATION RATE: 19 BRPM | HEART RATE: 70 BPM | SYSTOLIC BLOOD PRESSURE: 126 MMHG | TEMPERATURE: 98 F | DIASTOLIC BLOOD PRESSURE: 79 MMHG

## 2019-10-06 LAB
ANION GAP SERPL CALC-SCNC: 8 MMOL/L — SIGNIFICANT CHANGE UP (ref 5–17)
BUN SERPL-MCNC: 7 MG/DL — SIGNIFICANT CHANGE UP (ref 7–23)
CALCIUM SERPL-MCNC: 9.3 MG/DL — SIGNIFICANT CHANGE UP (ref 8.5–10.1)
CHLORIDE SERPL-SCNC: 104 MMOL/L — SIGNIFICANT CHANGE UP (ref 96–108)
CO2 SERPL-SCNC: 28 MMOL/L — SIGNIFICANT CHANGE UP (ref 22–31)
CREAT SERPL-MCNC: 0.65 MG/DL — SIGNIFICANT CHANGE UP (ref 0.5–1.3)
GLUCOSE SERPL-MCNC: 125 MG/DL — HIGH (ref 70–99)
HCT VFR BLD CALC: 37 % — SIGNIFICANT CHANGE UP (ref 34.5–45)
HGB BLD-MCNC: 12.4 G/DL — SIGNIFICANT CHANGE UP (ref 11.5–15.5)
MAGNESIUM SERPL-MCNC: 1.9 MG/DL — SIGNIFICANT CHANGE UP (ref 1.6–2.6)
MCHC RBC-ENTMCNC: 30.6 PG — SIGNIFICANT CHANGE UP (ref 27–34)
MCHC RBC-ENTMCNC: 33.5 GM/DL — SIGNIFICANT CHANGE UP (ref 32–36)
MCV RBC AUTO: 91.4 FL — SIGNIFICANT CHANGE UP (ref 80–100)
NRBC # BLD: 0 /100 WBCS — SIGNIFICANT CHANGE UP (ref 0–0)
PLATELET # BLD AUTO: 156 K/UL — SIGNIFICANT CHANGE UP (ref 150–400)
POTASSIUM SERPL-MCNC: 3.6 MMOL/L — SIGNIFICANT CHANGE UP (ref 3.5–5.3)
POTASSIUM SERPL-SCNC: 3.6 MMOL/L — SIGNIFICANT CHANGE UP (ref 3.5–5.3)
RBC # BLD: 4.05 M/UL — SIGNIFICANT CHANGE UP (ref 3.8–5.2)
RBC # FLD: 13.3 % — SIGNIFICANT CHANGE UP (ref 10.3–14.5)
SODIUM SERPL-SCNC: 140 MMOL/L — SIGNIFICANT CHANGE UP (ref 135–145)
WBC # BLD: 12.31 K/UL — HIGH (ref 3.8–10.5)
WBC # FLD AUTO: 12.31 K/UL — HIGH (ref 3.8–10.5)

## 2019-10-06 PROCEDURE — 85730 THROMBOPLASTIN TIME PARTIAL: CPT

## 2019-10-06 PROCEDURE — 99239 HOSP IP/OBS DSCHRG MGMT >30: CPT

## 2019-10-06 PROCEDURE — 85027 COMPLETE CBC AUTOMATED: CPT

## 2019-10-06 PROCEDURE — 83735 ASSAY OF MAGNESIUM: CPT

## 2019-10-06 PROCEDURE — 87150 DNA/RNA AMPLIFIED PROBE: CPT

## 2019-10-06 PROCEDURE — 36415 COLL VENOUS BLD VENIPUNCTURE: CPT

## 2019-10-06 PROCEDURE — 74177 CT ABD & PELVIS W/CONTRAST: CPT

## 2019-10-06 PROCEDURE — 90662 IIV NO PRSV INCREASED AG IM: CPT

## 2019-10-06 PROCEDURE — 84100 ASSAY OF PHOSPHORUS: CPT

## 2019-10-06 PROCEDURE — 86803 HEPATITIS C AB TEST: CPT

## 2019-10-06 PROCEDURE — 74018 RADEX ABDOMEN 1 VIEW: CPT

## 2019-10-06 PROCEDURE — 80076 HEPATIC FUNCTION PANEL: CPT

## 2019-10-06 PROCEDURE — 87186 SC STD MICRODIL/AGAR DIL: CPT

## 2019-10-06 PROCEDURE — 96374 THER/PROPH/DIAG INJ IV PUSH: CPT

## 2019-10-06 PROCEDURE — 81001 URINALYSIS AUTO W/SCOPE: CPT

## 2019-10-06 PROCEDURE — 80053 COMPREHEN METABOLIC PANEL: CPT

## 2019-10-06 PROCEDURE — 93005 ELECTROCARDIOGRAM TRACING: CPT

## 2019-10-06 PROCEDURE — 71045 X-RAY EXAM CHEST 1 VIEW: CPT

## 2019-10-06 PROCEDURE — 80048 BASIC METABOLIC PNL TOTAL CA: CPT

## 2019-10-06 PROCEDURE — 87086 URINE CULTURE/COLONY COUNT: CPT

## 2019-10-06 PROCEDURE — C2617: CPT

## 2019-10-06 PROCEDURE — 99285 EMERGENCY DEPT VISIT HI MDM: CPT | Mod: 25

## 2019-10-06 PROCEDURE — C1769: CPT

## 2019-10-06 PROCEDURE — 83605 ASSAY OF LACTIC ACID: CPT

## 2019-10-06 PROCEDURE — 76000 FLUOROSCOPY <1 HR PHYS/QHP: CPT

## 2019-10-06 PROCEDURE — C1758: CPT

## 2019-10-06 PROCEDURE — 87040 BLOOD CULTURE FOR BACTERIA: CPT

## 2019-10-06 PROCEDURE — 85610 PROTHROMBIN TIME: CPT

## 2019-10-06 RX ORDER — CIPROFLOXACIN LACTATE 400MG/40ML
1 VIAL (ML) INTRAVENOUS
Qty: 14 | Refills: 0
Start: 2019-10-06 | End: 2019-10-12

## 2019-10-06 RX ORDER — LACTOBACILLUS ACIDOPHILUS 100MM CELL
2 CAPSULE ORAL
Qty: 0 | Refills: 0 | DISCHARGE
Start: 2019-10-06

## 2019-10-06 RX ORDER — PHENAZOPYRIDINE HCL 100 MG
1 TABLET ORAL
Qty: 21 | Refills: 0
Start: 2019-10-06 | End: 2019-10-12

## 2019-10-06 RX ORDER — ACETAMINOPHEN 500 MG
2 TABLET ORAL
Qty: 0 | Refills: 0 | DISCHARGE
Start: 2019-10-06

## 2019-10-06 RX ORDER — INFLUENZA VIRUS VACCINE 15; 15; 15; 15 UG/.5ML; UG/.5ML; UG/.5ML; UG/.5ML
0.5 SUSPENSION INTRAMUSCULAR ONCE
Refills: 0 | Status: DISCONTINUED | OUTPATIENT
Start: 2019-10-06 | End: 2019-10-06

## 2019-10-06 RX ADMIN — Medication 1000 MILLIGRAM(S): at 12:37

## 2019-10-06 RX ADMIN — Medication 200 MILLIGRAM(S): at 14:42

## 2019-10-06 RX ADMIN — PANTOPRAZOLE SODIUM 40 MILLIGRAM(S): 20 TABLET, DELAYED RELEASE ORAL at 06:19

## 2019-10-06 RX ADMIN — Medication 200 MILLIGRAM(S): at 06:19

## 2019-10-06 RX ADMIN — INFLUENZA VIRUS VACCINE 0.5 MILLILITER(S): 15; 15; 15; 15 SUSPENSION INTRAMUSCULAR at 14:43

## 2019-10-06 RX ADMIN — Medication 1000 MILLIGRAM(S): at 13:37

## 2019-10-06 RX ADMIN — Medication 1 TABLET(S): at 08:22

## 2019-10-06 RX ADMIN — Medication 200 MILLIGRAM(S): at 08:22

## 2019-10-06 NOTE — DISCHARGE NOTE PROVIDER - CARE PROVIDER_API CALL
Omi Isaac)  Urology  [address] [facility] [facility] [facility], [address]  [place] [address]  Phone: [phone]  Fax: [phone]  Follow Up Time:

## 2019-10-06 NOTE — PROGRESS NOTE ADULT - ASSESSMENT
75 yo F with reported hx of "colon problem" presents with fever and LLQ abdominal pain, found to have L obstructive uropathy 2/2 L ureteropelvic junction calculus.
Pyelonephritis   and septicemia secondary obstructing urolithiasis improving s/p stent and antibiotics - continue present therapy

## 2019-10-06 NOTE — DISCHARGE NOTE NURSING/CASE MANAGEMENT/SOCIAL WORK - PATIENT PORTAL LINK FT
You can access the FollowMyHealth Patient Portal offered by [facility] by registering at the following website: [email or web address]. By joining [employer]’s FollowMyHealth portal, you will also be able to view your health information using other applications (apps) compatible with our system.

## 2019-10-06 NOTE — DISCHARGE NOTE NURSING/CASE MANAGEMENT/SOCIAL WORK - NSDCPECAREGIVERED_GEN_ALL_CORE
patient given printed education material on Obstructive uropathy, sepsis, kidney stone, influenza vaccine/Yes

## 2019-10-06 NOTE — PROGRESS NOTE ADULT - REASON FOR ADMISSION
L obstructive uropathy 2/2 L ureteropelvic junction calculus

## 2019-10-06 NOTE — DISCHARGE NOTE PROVIDER - HOSPITAL COURSE
75 yo F with reported hx of "colon problem" (on esomeprazole) and HTN (on olmesartan) presents with fever and LLQ abdominal pain. Patient is here on vacation from [facility]. Pt found to have enterobacter bacteremia 2/2 1cm nephrolithiasis causing obstructive uropathy. The patient underwent cystoscopy w/ stent placement and will require urology follow up for laser lithotripsy. Pt is undecided about whether she would like to seek medical treatment in the [place] vs. medical treatment in her native country of [facility]. Bacteremia cleared w/ meropenem and the patient was transitioned to cipro. Will complete 10d of abx. I utilized the  phone for communication during the hospitalization and on the day of discharge, the pt's primary RN speaks fluent Kyrgyz and he interpreted for our interaction. Pt is hemodynamically stable and comfortable on day of discahrge. Eager for d/c home.         ---    CONSULTANTS:     JAZMÍN (Marlin)    ID (Sujatha)        ---    TIME SPENT:    The total amount of time spent reviewing the hospital notes, laboratory values, imaging findings, assessing/counseling the patient, discussing with consultant physicians, social work, nursing staff took 45 minutes        ---    T(C): 36.7 (10-06-19 @ 08:01), Max: 37.1 (10-06-19 @ 00:41)    HR: 70 (10-06-19 @ 08:01) (56 - 70)    BP: 126/79 (10-06-19 @ 08:01) (125/86 - 156/74)    RR: 19 (10-06-19 @ 08:01) (18 - 19)    SpO2: 95% (10-06-19 @ 08:01) (95% - 97%)        PHYSICAL EXAM:    GENERAL: patient appears well, no acute distress, appropriate     EYES: sclera clear, no exudates    ENMT: oropharynx clear without erythema, no exudates, moist mucous membranes    LUNGS: good air entry bilaterally, clear to auscultation, symmetric breath sounds, no wheezing or rhonchi appreciated    HEART: soft S1/S2, regular rate and rhythm, no murmurs noted, no lower extremity edema    GASTROINTESTINAL: abdomen is soft, nontender, nondistended, normoactive bowel sounds, no palpable masses    BACK: no CVA tenderness b/l    INTEGUMENT: good skin turgor, no lesions noted    MUSCULOSKELETAL: no clubbing or cyanosis, no obvious deformity    NEUROLOGIC: awake, alert, oriented x3, good muscle tone in 4 extremities, no obvious sensory deficits

## 2019-10-06 NOTE — DISCHARGE NOTE PROVIDER - NSDCFUADDINST_GEN_ALL_CORE_FT
Please call to schedule your follow up appointments with your doctors (primary care doctor, urology)  Please take your medications as detailed in your medication reconciliation  Please return to the ED for worsening of your medical condition

## 2019-10-06 NOTE — DISCHARGE NOTE PROVIDER - NSDCCPCAREPLAN_GEN_ALL_CORE_FT
PRINCIPAL DISCHARGE DIAGNOSIS  Diagnosis: Obstructive uropathy  Assessment and Plan of Treatment: Likely 2/2 1cm renal stone. Kidney function is stable. Please follow up with urology for further management. Please continue antibiotics to complete treatment of bacteremia.      SECONDARY DISCHARGE DIAGNOSES  Diagnosis: Sepsis  Assessment and Plan of Treatment: Resolved.

## 2019-10-08 LAB
CULTURE RESULTS: SIGNIFICANT CHANGE UP
SPECIMEN SOURCE: SIGNIFICANT CHANGE UP

## 2019-11-23 ENCOUNTER — EMERGENCY (EMERGENCY)
Facility: HOSPITAL | Age: 74
LOS: 1 days | Discharge: HOME | End: 2019-11-23
Attending: EMERGENCY MEDICINE | Admitting: EMERGENCY MEDICINE
Payer: SELF-PAY

## 2019-11-23 VITALS
RESPIRATION RATE: 16 BRPM | DIASTOLIC BLOOD PRESSURE: 78 MMHG | OXYGEN SATURATION: 99 % | HEART RATE: 61 BPM | TEMPERATURE: 98 F | SYSTOLIC BLOOD PRESSURE: 172 MMHG | WEIGHT: 149.91 LBS | HEIGHT: 62 IN

## 2019-11-23 PROBLEM — I10 ESSENTIAL (PRIMARY) HYPERTENSION: Chronic | Status: ACTIVE | Noted: 2019-10-02

## 2019-11-23 PROBLEM — K21.9 GASTRO-ESOPHAGEAL REFLUX DISEASE WITHOUT ESOPHAGITIS: Chronic | Status: ACTIVE | Noted: 2019-10-02

## 2019-11-23 LAB
ANION GAP SERPL CALC-SCNC: 4 MMOL/L — LOW (ref 5–17)
APPEARANCE UR: CLEAR — SIGNIFICANT CHANGE UP
BILIRUB UR-MCNC: NEGATIVE — SIGNIFICANT CHANGE UP
BUN SERPL-MCNC: 7 MG/DL — SIGNIFICANT CHANGE UP (ref 7–23)
CALCIUM SERPL-MCNC: 9.1 MG/DL — SIGNIFICANT CHANGE UP (ref 8.5–10.1)
CHLORIDE SERPL-SCNC: 109 MMOL/L — HIGH (ref 96–108)
CO2 SERPL-SCNC: 30 MMOL/L — SIGNIFICANT CHANGE UP (ref 22–31)
COLOR SPEC: SIGNIFICANT CHANGE UP
CREAT SERPL-MCNC: 0.72 MG/DL — SIGNIFICANT CHANGE UP (ref 0.5–1.3)
DIFF PNL FLD: ABNORMAL
GLUCOSE SERPL-MCNC: 109 MG/DL — HIGH (ref 70–99)
GLUCOSE UR QL: NEGATIVE — SIGNIFICANT CHANGE UP
HCT VFR BLD CALC: 36.7 % — SIGNIFICANT CHANGE UP (ref 34.5–45)
HGB BLD-MCNC: 12.2 G/DL — SIGNIFICANT CHANGE UP (ref 11.5–15.5)
KETONES UR-MCNC: NEGATIVE — SIGNIFICANT CHANGE UP
LEUKOCYTE ESTERASE UR-ACNC: ABNORMAL
MCHC RBC-ENTMCNC: 30.7 PG — SIGNIFICANT CHANGE UP (ref 27–34)
MCHC RBC-ENTMCNC: 33.2 GM/DL — SIGNIFICANT CHANGE UP (ref 32–36)
MCV RBC AUTO: 92.2 FL — SIGNIFICANT CHANGE UP (ref 80–100)
NITRITE UR-MCNC: NEGATIVE — SIGNIFICANT CHANGE UP
NRBC # BLD: 0 /100 WBCS — SIGNIFICANT CHANGE UP (ref 0–0)
PH UR: 7 — SIGNIFICANT CHANGE UP (ref 5–8)
PLATELET # BLD AUTO: 212 K/UL — SIGNIFICANT CHANGE UP (ref 150–400)
POTASSIUM SERPL-MCNC: 3.5 MMOL/L — SIGNIFICANT CHANGE UP (ref 3.5–5.3)
POTASSIUM SERPL-SCNC: 3.5 MMOL/L — SIGNIFICANT CHANGE UP (ref 3.5–5.3)
PROT UR-MCNC: NEGATIVE — SIGNIFICANT CHANGE UP
RBC # BLD: 3.98 M/UL — SIGNIFICANT CHANGE UP (ref 3.8–5.2)
RBC # FLD: 13.1 % — SIGNIFICANT CHANGE UP (ref 10.3–14.5)
SODIUM SERPL-SCNC: 143 MMOL/L — SIGNIFICANT CHANGE UP (ref 135–145)
SP GR SPEC: 1 — LOW (ref 1.01–1.02)
UROBILINOGEN FLD QL: NEGATIVE — SIGNIFICANT CHANGE UP
WBC # BLD: 8.11 K/UL — SIGNIFICANT CHANGE UP (ref 3.8–10.5)
WBC # FLD AUTO: 8.11 K/UL — SIGNIFICANT CHANGE UP (ref 3.8–10.5)

## 2019-11-23 PROCEDURE — 74176 CT ABD & PELVIS W/O CONTRAST: CPT | Mod: 26

## 2019-11-23 PROCEDURE — 99284 EMERGENCY DEPT VISIT MOD MDM: CPT | Mod: 25

## 2019-11-23 PROCEDURE — 36415 COLL VENOUS BLD VENIPUNCTURE: CPT

## 2019-11-23 PROCEDURE — 81001 URINALYSIS AUTO W/SCOPE: CPT

## 2019-11-23 PROCEDURE — 87086 URINE CULTURE/COLONY COUNT: CPT

## 2019-11-23 PROCEDURE — 80048 BASIC METABOLIC PNL TOTAL CA: CPT

## 2019-11-23 PROCEDURE — 85027 COMPLETE CBC AUTOMATED: CPT

## 2019-11-23 PROCEDURE — 99285 EMERGENCY DEPT VISIT HI MDM: CPT

## 2019-11-23 PROCEDURE — 74176 CT ABD & PELVIS W/O CONTRAST: CPT

## 2019-11-23 RX ORDER — SODIUM CHLORIDE 9 MG/ML
1000 INJECTION INTRAMUSCULAR; INTRAVENOUS; SUBCUTANEOUS ONCE
Refills: 0 | Status: COMPLETED | OUTPATIENT
Start: 2019-11-23 | End: 2019-11-23

## 2019-11-23 RX ADMIN — SODIUM CHLORIDE 1000 MILLILITER(S): 9 INJECTION INTRAMUSCULAR; INTRAVENOUS; SUBCUTANEOUS at 12:50

## 2019-11-23 NOTE — ED PROVIDER NOTE - NSFOLLOWUPINSTRUCTIONS_ED_ALL_ED_FT
Rest  Increase fluid intake  Follow-up with Dr. Tay this week  Motrin for pain if needed  Return for any fever or chills.

## 2019-11-23 NOTE — ED PROVIDER NOTE - CLINICAL SUMMARY MEDICAL DECISION MAKING FREE TEXT BOX
Patient with stent in left ureter with recent urosepsis one mo ago now with lower abd pain and left flank pain requiring evaluation, labs, ct scan and IVFs and meds

## 2019-11-23 NOTE — ED PROVIDER NOTE - CONSTITUTIONAL, MLM
normal... Well appearing,  female, overweight, well nourished, awake, alert, oriented to person, place, time/situation and in no apparent distress.

## 2019-11-23 NOTE — ED PROVIDER NOTE - CARE PROVIDER_API CALL
Mannie Tay)  Urology  [address] [facility] [address], [address]  [place] [address]  Phone: [phone]  Fax: [phone]  Follow Up Time:

## 2019-11-23 NOTE — ED PROVIDER NOTE - PROGRESS NOTE
Calling regarding: Benton with [facility] said a prescription for ATENOLOL was sent over to them this morning but it's on back order.   Benton said they'll need something else and asked if the nurse would call that in. Please advise.     Phone number to be reached at: [phone]   Improved.

## 2019-11-23 NOTE — ED PROVIDER NOTE - PROGRESS NOTE DETAILS
Feels well. Case reviewed in depth with Dr. Tay who knows patient and he reviewed ct scan. Feels that pain is stent pain. Will be able to be discharged home to follow-up with him as outpatient.

## 2019-11-23 NOTE — ED ADULT NURSE NOTE - OBJECTIVE STATEMENT
Present to ER with c/o of abdominal pain since last night. Pt states she has history of stent put in by Dr. Tay. Pt also c/o of burning when urination. Denies any chest pain.

## 2019-11-23 NOTE — ED PROVIDER NOTE - PATIENT PORTAL LINK FT
You can access the FollowMyHealth Patient Portal offered by [facility] by registering at the following website: [email or web address]. By joining [employer]’s FollowMyHealth portal, you will also be able to view your health information using other applications (apps) compatible with our system.

## 2019-11-23 NOTE — ED PROVIDER NOTE - OBJECTIVE STATEMENT
75 yo  female with H/O Gastroesophageal reflux disease, esophagitis presence not specified    Hypertension and Recent Renal Colic complicated by Enterobacter Urosepsis/Bacteremia requiring admission and stenting on Left Ureter, well until 3-4 days ago when she began to develop mild dysuria and slight LLQ and left flank pain. Pain is constant and dull. No fever or chills. No nausea/vomiting. Seen by Dr. Tay in office 4-days ago.

## 2019-11-24 LAB
CULTURE RESULTS: SIGNIFICANT CHANGE UP
SPECIMEN SOURCE: SIGNIFICANT CHANGE UP

## 2019-12-21 NOTE — ED PROVIDER NOTE - CHPI ED SYMPTOMS POS
-Continue daily PPI  -Per general surgery recommendations, patient will need follow-up EGD for CT scan findings of thickened gastric wall     DYSURIA/LLQ Pain/BURNING URINATION/PAIN

## 2020-01-05 PROBLEM — Z00.00 ENCOUNTER FOR PREVENTIVE HEALTH EXAMINATION: Status: ACTIVE | Noted: 2020-01-05

## 2020-01-06 ENCOUNTER — APPOINTMENT (OUTPATIENT)
Dept: INTERNAL MEDICINE | Facility: CLINIC | Age: 75
End: 2020-01-06

## 2022-03-23 NOTE — PROVIDER CONTACT NOTE (CRITICAL VALUE NOTIFICATION) - SITUATION
Left detailed msg above via voicemail  Asked patient to call back with instructions on OTC pain control  Lactate= 2.5

## 2022-10-03 NOTE — PATIENT PROFILE ADULT - LAST BOWEL MOVEMENT DATE
03-Oct-2019 Benzoyl Peroxide Pregnancy And Lactation Text: This medication is Pregnancy Category C. It is unknown if benzoyl peroxide is excreted in breast milk.

## 2022-12-08 NOTE — PROVIDER CONTACT NOTE (CRITICAL VALUE NOTIFICATION) - ACTION/TREATMENT ORDERED:
[address], [place]  Ph: [phone]     Fax: [phone]    Discharge Summary 2-15    Patient name: Vanessa Sandoval  : 1965  Provider#: [id number]  Referral source: Malikmariposa Chalinorogelio*      Medical/Treatment Diagnosis: Vertebrogenic low back pain [M54.51]     Prior Hospitalization: see medical history     Comorbidities: See Plan of Care  Prior Level of Function: See Plan of Care  Medications: Verified on Patient Summary List    Start of Care:       Onset Date:chronic   Visits from Start of Care: 3   Missed Visits: 3  Reporting Period : 2022 to 2022    Assessment / Summary of care:   Pt missed her last 3 appointments and has abdicated therapu    Progress Toward Goals:  Unmet  Short Term Goals: To be accomplished in 5 treatments. 1)  Pt to be independent with HEP  2)  Pt to improve lumbar flexion so she can don/doff shoes without pain beyond 4/10     Long Term Goals: To be accomplished in 10 treatments.   1)  Pt to demonstrate the ability to get in and out of bed without pain in her lower back using proper technique to minimize facet joint arthropathy strain to improve comfort with IADL's  2)  Pt to improve LE strength to WNL so she can perform functional mobility without back pain beyond 3/10    RECOMMENDATIONS:  [x]Discontinue therapy: []Patient has reached or is progressing toward set goals     [x]Patient is non-compliant or has abdicated     []Due to lack of appreciable progress towards set goals     []Donato Conrad, PT, DPT 2022
1 L NS bolus
To continue current plan of care; pt. is on Rocephin and will repeat another set of blood cultures. Lab called at 22:39 to report 2nd set of blood cultures also drawn on 10/1 is positive for gram negative rods in anaerobic bottle. Result reported to Dr. Shaw at 22:56 and ordered to continue current plan of care as previously discussed
Fluid treatment in progress. To repeat lactate after bolus completion.
Give 1 liter fluid bolus, repeat lactate later
none at this time

## 2023-09-25 NOTE — PROVIDER CONTACT NOTE (CRITICAL VALUE NOTIFICATION) - ASSESSMENT
Called pt to reschedule appt, not able to reach pt.  
Pt awake and alert.  VS stable, pt voiding via bedpan 600 ml.  Pt denies any c/o pain at this time
afebrile

## 2024-10-09 NOTE — ED PROVIDER NOTE - GASTROINTESTINAL [+], MLM
Patient called in requesting a few more days of pain meds. S/P Laparoscopic low anterior resection and Laparoscopic mobilization of the splenic flexure on 09/24/24. Patient states that he stopped taking pain meds  3 days ago and has tried using Tylenol and Ibuprofen. He says that it isn't helping at all and that he is miserable.Sent to on call dr juarez to Dr. Berman being out. He has an appt to see Dr. Berman on 10/14. Pharmacy is correct in chart.   ABDOMINAL PAIN/Left Flank Pain